# Patient Record
Sex: FEMALE | Race: OTHER | NOT HISPANIC OR LATINO | URBAN - METROPOLITAN AREA
[De-identification: names, ages, dates, MRNs, and addresses within clinical notes are randomized per-mention and may not be internally consistent; named-entity substitution may affect disease eponyms.]

---

## 2018-02-05 ENCOUNTER — EMERGENCY (EMERGENCY)
Facility: HOSPITAL | Age: 68
LOS: 1 days | End: 2018-02-05
Attending: EMERGENCY MEDICINE
Payer: MEDICAID

## 2018-02-05 ENCOUNTER — INPATIENT (INPATIENT)
Facility: HOSPITAL | Age: 68
LOS: 2 days | Discharge: HOME | DRG: 690 | End: 2018-02-08
Attending: FAMILY MEDICINE | Admitting: HOSPITALIST
Payer: MEDICAID

## 2018-02-05 VITALS
WEIGHT: 171.96 LBS | DIASTOLIC BLOOD PRESSURE: 55 MMHG | OXYGEN SATURATION: 94 % | HEIGHT: 65 IN | SYSTOLIC BLOOD PRESSURE: 90 MMHG | TEMPERATURE: 102 F | HEART RATE: 126 BPM | RESPIRATION RATE: 16 BRPM

## 2018-02-05 VITALS
HEART RATE: 75 BPM | DIASTOLIC BLOOD PRESSURE: 57 MMHG | TEMPERATURE: 99 F | OXYGEN SATURATION: 100 % | SYSTOLIC BLOOD PRESSURE: 108 MMHG | RESPIRATION RATE: 15 BRPM

## 2018-02-05 VITALS
SYSTOLIC BLOOD PRESSURE: 130 MMHG | OXYGEN SATURATION: 99 % | WEIGHT: 171.96 LBS | TEMPERATURE: 99 F | HEIGHT: 65 IN | HEART RATE: 84 BPM | RESPIRATION RATE: 16 BRPM | DIASTOLIC BLOOD PRESSURE: 66 MMHG

## 2018-02-05 DIAGNOSIS — Z98.890 OTHER SPECIFIED POSTPROCEDURAL STATES: Chronic | ICD-10-CM

## 2018-02-05 LAB
ALBUMIN SERPL ELPH-MCNC: 2.7 G/DL — LOW (ref 3.3–5)
ALBUMIN SERPL ELPH-MCNC: 2.9 G/DL — LOW (ref 3.3–5)
ALP SERPL-CCNC: 102 U/L — SIGNIFICANT CHANGE UP (ref 40–120)
ALP SERPL-CCNC: 92 U/L — SIGNIFICANT CHANGE UP (ref 40–120)
ALT FLD-CCNC: 19 U/L — SIGNIFICANT CHANGE UP (ref 12–78)
ALT FLD-CCNC: 27 U/L — SIGNIFICANT CHANGE UP (ref 12–78)
ANION GAP SERPL CALC-SCNC: 10 MMOL/L — SIGNIFICANT CHANGE UP (ref 5–17)
ANION GAP SERPL CALC-SCNC: 9 MMOL/L — SIGNIFICANT CHANGE UP (ref 5–17)
APPEARANCE UR: ABNORMAL
APPEARANCE UR: ABNORMAL
APTT BLD: 24.3 SEC — LOW (ref 27.5–37.4)
APTT BLD: 29.2 SEC — SIGNIFICANT CHANGE UP (ref 27.5–37.4)
AST SERPL-CCNC: 28 U/L — SIGNIFICANT CHANGE UP (ref 15–37)
AST SERPL-CCNC: 33 U/L — SIGNIFICANT CHANGE UP (ref 15–37)
BACTERIA # UR AUTO: ABNORMAL
BACTERIA # UR AUTO: ABNORMAL
BASOPHILS # BLD AUTO: 0.1 K/UL — SIGNIFICANT CHANGE UP (ref 0–0.2)
BASOPHILS # BLD AUTO: 0.1 K/UL — SIGNIFICANT CHANGE UP (ref 0–0.2)
BASOPHILS NFR BLD AUTO: 0.5 % — SIGNIFICANT CHANGE UP (ref 0–2)
BASOPHILS NFR BLD AUTO: 0.8 % — SIGNIFICANT CHANGE UP (ref 0–2)
BILIRUB SERPL-MCNC: 0.3 MG/DL — SIGNIFICANT CHANGE UP (ref 0.2–1.2)
BILIRUB SERPL-MCNC: 0.6 MG/DL — SIGNIFICANT CHANGE UP (ref 0.2–1.2)
BILIRUB UR-MCNC: NEGATIVE — SIGNIFICANT CHANGE UP
BILIRUB UR-MCNC: NEGATIVE — SIGNIFICANT CHANGE UP
BUN SERPL-MCNC: 13 MG/DL — SIGNIFICANT CHANGE UP (ref 7–23)
BUN SERPL-MCNC: 16 MG/DL — SIGNIFICANT CHANGE UP (ref 7–23)
CALCIUM SERPL-MCNC: 8 MG/DL — LOW (ref 8.5–10.1)
CALCIUM SERPL-MCNC: 9.3 MG/DL — SIGNIFICANT CHANGE UP (ref 8.5–10.1)
CHLORIDE SERPL-SCNC: 103 MMOL/L — SIGNIFICANT CHANGE UP (ref 96–108)
CHLORIDE SERPL-SCNC: 113 MMOL/L — HIGH (ref 96–108)
CO2 SERPL-SCNC: 20 MMOL/L — LOW (ref 22–31)
CO2 SERPL-SCNC: 23 MMOL/L — SIGNIFICANT CHANGE UP (ref 22–31)
COLOR SPEC: YELLOW — SIGNIFICANT CHANGE UP
COLOR SPEC: YELLOW — SIGNIFICANT CHANGE UP
CREAT SERPL-MCNC: 0.83 MG/DL — SIGNIFICANT CHANGE UP (ref 0.5–1.3)
CREAT SERPL-MCNC: 1.1 MG/DL — SIGNIFICANT CHANGE UP (ref 0.5–1.3)
DIFF PNL FLD: ABNORMAL
DIFF PNL FLD: ABNORMAL
E COLI DNA BLD POS QL NAA+NON-PROBE: SIGNIFICANT CHANGE UP
EOSINOPHIL # BLD AUTO: 0 K/UL — SIGNIFICANT CHANGE UP (ref 0–0.5)
EOSINOPHIL # BLD AUTO: 0.2 K/UL — SIGNIFICANT CHANGE UP (ref 0–0.5)
EOSINOPHIL NFR BLD AUTO: 0.2 % — SIGNIFICANT CHANGE UP (ref 0–6)
EOSINOPHIL NFR BLD AUTO: 1.1 % — SIGNIFICANT CHANGE UP (ref 0–6)
EPI CELLS # UR: SIGNIFICANT CHANGE UP
EPI CELLS # UR: SIGNIFICANT CHANGE UP
GLUCOSE SERPL-MCNC: 101 MG/DL — HIGH (ref 70–99)
GLUCOSE SERPL-MCNC: 105 MG/DL — HIGH (ref 70–99)
GLUCOSE UR QL: NEGATIVE — SIGNIFICANT CHANGE UP
GLUCOSE UR QL: NEGATIVE — SIGNIFICANT CHANGE UP
GRAM STN FLD: SIGNIFICANT CHANGE UP
HCT VFR BLD CALC: 26.6 % — LOW (ref 34.5–45)
HCT VFR BLD CALC: 29.9 % — LOW (ref 34.5–45)
HGB BLD-MCNC: 8.2 G/DL — LOW (ref 11.5–15.5)
HGB BLD-MCNC: 9.5 G/DL — LOW (ref 11.5–15.5)
INR BLD: 1.29 RATIO — HIGH (ref 0.88–1.16)
INR BLD: 1.31 RATIO — HIGH (ref 0.88–1.16)
KETONES UR-MCNC: NEGATIVE — SIGNIFICANT CHANGE UP
KETONES UR-MCNC: NEGATIVE — SIGNIFICANT CHANGE UP
LACTATE SERPL-SCNC: 1.1 MMOL/L — SIGNIFICANT CHANGE UP (ref 0.7–2)
LACTATE SERPL-SCNC: 2.1 MMOL/L — HIGH (ref 0.7–2)
LACTATE SERPL-SCNC: 2.9 MMOL/L — HIGH (ref 0.7–2)
LEUKOCYTE ESTERASE UR-ACNC: ABNORMAL
LEUKOCYTE ESTERASE UR-ACNC: ABNORMAL
LIDOCAIN IGE QN: 143 U/L — SIGNIFICANT CHANGE UP (ref 73–393)
LYMPHOCYTES # BLD AUTO: 0.3 K/UL — LOW (ref 1–3.3)
LYMPHOCYTES # BLD AUTO: 1.2 K/UL — SIGNIFICANT CHANGE UP (ref 1–3.3)
LYMPHOCYTES # BLD AUTO: 2.2 % — LOW (ref 13–44)
LYMPHOCYTES # BLD AUTO: 7.7 % — LOW (ref 13–44)
MAGNESIUM SERPL-MCNC: 1.7 MG/DL — SIGNIFICANT CHANGE UP (ref 1.6–2.6)
MCHC RBC-ENTMCNC: 28.8 PG — SIGNIFICANT CHANGE UP (ref 27–34)
MCHC RBC-ENTMCNC: 29.5 PG — SIGNIFICANT CHANGE UP (ref 27–34)
MCHC RBC-ENTMCNC: 30.7 GM/DL — LOW (ref 32–36)
MCHC RBC-ENTMCNC: 31.8 GM/DL — LOW (ref 32–36)
MCV RBC AUTO: 93 FL — SIGNIFICANT CHANGE UP (ref 80–100)
MCV RBC AUTO: 93.7 FL — SIGNIFICANT CHANGE UP (ref 80–100)
METHOD TYPE: SIGNIFICANT CHANGE UP
MONOCYTES # BLD AUTO: 0.2 K/UL — SIGNIFICANT CHANGE UP (ref 0–0.9)
MONOCYTES # BLD AUTO: 1.1 K/UL — HIGH (ref 0–0.9)
MONOCYTES NFR BLD AUTO: 1.6 % — SIGNIFICANT CHANGE UP (ref 1–9)
MONOCYTES NFR BLD AUTO: 7 % — SIGNIFICANT CHANGE UP (ref 1–9)
NEUTROPHILS # BLD AUTO: 12.4 K/UL — HIGH (ref 1.8–7.4)
NEUTROPHILS # BLD AUTO: 13 K/UL — HIGH (ref 1.8–7.4)
NEUTROPHILS NFR BLD AUTO: 83.7 % — HIGH (ref 43–77)
NEUTROPHILS NFR BLD AUTO: 95.1 % — HIGH (ref 43–77)
NITRITE UR-MCNC: NEGATIVE — SIGNIFICANT CHANGE UP
NITRITE UR-MCNC: POSITIVE
PH UR: 5 — SIGNIFICANT CHANGE UP (ref 5–8)
PH UR: 6.5 — SIGNIFICANT CHANGE UP (ref 5–8)
PLAT MORPH BLD: NORMAL — SIGNIFICANT CHANGE UP
PLATELET # BLD AUTO: 281 K/UL — SIGNIFICANT CHANGE UP (ref 150–400)
PLATELET # BLD AUTO: 374 K/UL — SIGNIFICANT CHANGE UP (ref 150–400)
POTASSIUM SERPL-MCNC: 3.9 MMOL/L — SIGNIFICANT CHANGE UP (ref 3.5–5.3)
POTASSIUM SERPL-MCNC: 4.2 MMOL/L — SIGNIFICANT CHANGE UP (ref 3.5–5.3)
POTASSIUM SERPL-SCNC: 3.9 MMOL/L — SIGNIFICANT CHANGE UP (ref 3.5–5.3)
POTASSIUM SERPL-SCNC: 4.2 MMOL/L — SIGNIFICANT CHANGE UP (ref 3.5–5.3)
PROT SERPL-MCNC: 6.2 G/DL — SIGNIFICANT CHANGE UP (ref 6–8.3)
PROT SERPL-MCNC: 6.9 G/DL — SIGNIFICANT CHANGE UP (ref 6–8.3)
PROT UR-MCNC: 75 MG/DL
PROT UR-MCNC: NEGATIVE — SIGNIFICANT CHANGE UP
PROTHROM AB SERPL-ACNC: 14.1 SEC — HIGH (ref 9.8–12.7)
PROTHROM AB SERPL-ACNC: 14.4 SEC — HIGH (ref 9.8–12.7)
RAPID RVP RESULT: SIGNIFICANT CHANGE UP
RBC # BLD: 2.84 M/UL — LOW (ref 3.8–5.2)
RBC # BLD: 3.22 M/UL — LOW (ref 3.8–5.2)
RBC # FLD: 16.5 % — HIGH (ref 10.3–14.5)
RBC # FLD: 17 % — HIGH (ref 10.3–14.5)
RBC BLD AUTO: NORMAL — SIGNIFICANT CHANGE UP
RBC CASTS # UR COMP ASSIST: ABNORMAL /HPF (ref 0–4)
RBC CASTS # UR COMP ASSIST: ABNORMAL /HPF (ref 0–4)
SODIUM SERPL-SCNC: 136 MMOL/L — SIGNIFICANT CHANGE UP (ref 135–145)
SODIUM SERPL-SCNC: 142 MMOL/L — SIGNIFICANT CHANGE UP (ref 135–145)
SP GR SPEC: 1 — LOW (ref 1.01–1.02)
SP GR SPEC: 1.01 — SIGNIFICANT CHANGE UP (ref 1.01–1.02)
SPECIMEN SOURCE: SIGNIFICANT CHANGE UP
UROBILINOGEN FLD QL: NEGATIVE — SIGNIFICANT CHANGE UP
UROBILINOGEN FLD QL: NEGATIVE — SIGNIFICANT CHANGE UP
WBC # BLD: 13 K/UL — HIGH (ref 3.8–10.5)
WBC # BLD: 15.6 K/UL — HIGH (ref 3.8–10.5)
WBC # FLD AUTO: 13 K/UL — HIGH (ref 3.8–10.5)
WBC # FLD AUTO: 15.6 K/UL — HIGH (ref 3.8–10.5)
WBC UR QL: ABNORMAL
WBC UR QL: ABNORMAL

## 2018-02-05 PROCEDURE — 87798 DETECT AGENT NOS DNA AMP: CPT

## 2018-02-05 PROCEDURE — 99285 EMERGENCY DEPT VISIT HI MDM: CPT

## 2018-02-05 PROCEDURE — 85027 COMPLETE CBC AUTOMATED: CPT

## 2018-02-05 PROCEDURE — 71046 X-RAY EXAM CHEST 2 VIEWS: CPT

## 2018-02-05 PROCEDURE — 87581 M.PNEUMON DNA AMP PROBE: CPT

## 2018-02-05 PROCEDURE — 87040 BLOOD CULTURE FOR BACTERIA: CPT

## 2018-02-05 PROCEDURE — 81001 URINALYSIS AUTO W/SCOPE: CPT

## 2018-02-05 PROCEDURE — 83605 ASSAY OF LACTIC ACID: CPT

## 2018-02-05 PROCEDURE — 87633 RESP VIRUS 12-25 TARGETS: CPT

## 2018-02-05 PROCEDURE — 87186 SC STD MICRODIL/AGAR DIL: CPT

## 2018-02-05 PROCEDURE — 74176 CT ABD & PELVIS W/O CONTRAST: CPT | Mod: 26

## 2018-02-05 PROCEDURE — 87086 URINE CULTURE/COLONY COUNT: CPT

## 2018-02-05 PROCEDURE — 96365 THER/PROPH/DIAG IV INF INIT: CPT

## 2018-02-05 PROCEDURE — 83735 ASSAY OF MAGNESIUM: CPT

## 2018-02-05 PROCEDURE — 99284 EMERGENCY DEPT VISIT MOD MDM: CPT | Mod: 25

## 2018-02-05 PROCEDURE — 87150 DNA/RNA AMPLIFIED PROBE: CPT

## 2018-02-05 PROCEDURE — 83690 ASSAY OF LIPASE: CPT

## 2018-02-05 PROCEDURE — 96375 TX/PRO/DX INJ NEW DRUG ADDON: CPT

## 2018-02-05 PROCEDURE — 85730 THROMBOPLASTIN TIME PARTIAL: CPT

## 2018-02-05 PROCEDURE — 80053 COMPREHEN METABOLIC PANEL: CPT

## 2018-02-05 PROCEDURE — 93010 ELECTROCARDIOGRAM REPORT: CPT

## 2018-02-05 PROCEDURE — 85610 PROTHROMBIN TIME: CPT

## 2018-02-05 PROCEDURE — 99285 EMERGENCY DEPT VISIT HI MDM: CPT | Mod: 25

## 2018-02-05 PROCEDURE — 87486 CHLMYD PNEUM DNA AMP PROBE: CPT

## 2018-02-05 PROCEDURE — 71046 X-RAY EXAM CHEST 2 VIEWS: CPT | Mod: 26

## 2018-02-05 RX ORDER — KETOROLAC TROMETHAMINE 30 MG/ML
30 SYRINGE (ML) INJECTION ONCE
Qty: 0 | Refills: 0 | Status: DISCONTINUED | OUTPATIENT
Start: 2018-02-05 | End: 2018-02-05

## 2018-02-05 RX ORDER — CEFTRIAXONE 500 MG/1
1 INJECTION, POWDER, FOR SOLUTION INTRAMUSCULAR; INTRAVENOUS ONCE
Qty: 0 | Refills: 0 | Status: COMPLETED | OUTPATIENT
Start: 2018-02-05 | End: 2018-02-05

## 2018-02-05 RX ORDER — CEFUROXIME AXETIL 250 MG
1 TABLET ORAL
Qty: 20 | Refills: 0 | OUTPATIENT
Start: 2018-02-05 | End: 2018-02-14

## 2018-02-05 RX ORDER — ONDANSETRON 8 MG/1
1 TABLET, FILM COATED ORAL
Qty: 30 | Refills: 0 | OUTPATIENT
Start: 2018-02-05

## 2018-02-05 RX ORDER — ACETAMINOPHEN 500 MG
650 TABLET ORAL ONCE
Qty: 0 | Refills: 0 | Status: COMPLETED | OUTPATIENT
Start: 2018-02-05 | End: 2018-02-05

## 2018-02-05 RX ORDER — SODIUM CHLORIDE 9 MG/ML
1000 INJECTION INTRAMUSCULAR; INTRAVENOUS; SUBCUTANEOUS
Qty: 0 | Refills: 0 | Status: COMPLETED | OUTPATIENT
Start: 2018-02-05 | End: 2018-02-05

## 2018-02-05 RX ORDER — SODIUM CHLORIDE 9 MG/ML
1000 INJECTION INTRAMUSCULAR; INTRAVENOUS; SUBCUTANEOUS ONCE
Qty: 0 | Refills: 0 | Status: COMPLETED | OUTPATIENT
Start: 2018-02-05 | End: 2018-02-05

## 2018-02-05 RX ADMIN — SODIUM CHLORIDE 1000 MILLILITER(S): 9 INJECTION INTRAMUSCULAR; INTRAVENOUS; SUBCUTANEOUS at 03:04

## 2018-02-05 RX ADMIN — SODIUM CHLORIDE 1000 MILLILITER(S): 9 INJECTION INTRAMUSCULAR; INTRAVENOUS; SUBCUTANEOUS at 23:36

## 2018-02-05 RX ADMIN — Medication 650 MILLIGRAM(S): at 03:04

## 2018-02-05 RX ADMIN — SODIUM CHLORIDE 1000 MILLILITER(S): 9 INJECTION INTRAMUSCULAR; INTRAVENOUS; SUBCUTANEOUS at 05:05

## 2018-02-05 RX ADMIN — CEFTRIAXONE 100 GRAM(S): 500 INJECTION, POWDER, FOR SOLUTION INTRAMUSCULAR; INTRAVENOUS at 03:31

## 2018-02-05 RX ADMIN — SODIUM CHLORIDE 1000 MILLILITER(S): 9 INJECTION INTRAMUSCULAR; INTRAVENOUS; SUBCUTANEOUS at 04:05

## 2018-02-05 RX ADMIN — Medication 650 MILLIGRAM(S): at 03:34

## 2018-02-05 RX ADMIN — CEFTRIAXONE 100 GRAM(S): 500 INJECTION, POWDER, FOR SOLUTION INTRAMUSCULAR; INTRAVENOUS at 23:37

## 2018-02-05 RX ADMIN — Medication 30 MILLIGRAM(S): at 03:34

## 2018-02-05 RX ADMIN — Medication 30 MILLIGRAM(S): at 03:04

## 2018-02-05 NOTE — ED ADULT NURSE NOTE - CHPI ED SYMPTOMS NEG
no vomiting/no hematuria/no abdominal distension/no nausea/no blood in stool/no burning urination/no diarrhea

## 2018-02-05 NOTE — PROVIDER CONTACT NOTE (CRITICAL VALUE NOTIFICATION) - ACTION/TREATMENT ORDERED:
Dr. Whitehead made aware and ordered to repeat lactate after 3rd liter of Normal saline. IV antibiotics given.

## 2018-02-05 NOTE — PROVIDER CONTACT NOTE (CRITICAL VALUE NOTIFICATION) - SITUATION
2/5/18 case discussed with Dr. Alcantar, pt needs to return to ED for repeat BC and abx,  pt called and results given, pt informed to return back to ED, pt verbalized and stated will return to ed

## 2018-02-05 NOTE — ED PROVIDER NOTE - CARE PLAN
Principal Discharge DX:	Positive blood cultures Principal Discharge DX:	Positive blood cultures  Secondary Diagnosis:	Pyelonephritis

## 2018-02-05 NOTE — ED PROVIDER NOTE - OBJECTIVE STATEMENT
pt called back to er for positive blood cultures from er visit early this am. pt relates was seen for fevers, chills, abck pain, was dx with pyelo, was d/c home with po abx, but called back to er for positive blood cultures. pt denies ha, cp, sob, cough  pmd - [facility] ([facility])

## 2018-02-05 NOTE — ED PROVIDER NOTE - OBJECTIVE STATEMENT
pt is a 68 yo f who has hx of ra, htn bl knee surg has been sick with fever chils and uri sx 1 week ago so her pmd gave her a z pack (dr soto at [facility]). she now presents with urine frequency, flank pain, fever and nausea. not a smoker is a social drinker allergic to gentamycin  she takes methotrexate and prednisolone for her ra

## 2018-02-05 NOTE — ED PROVIDER NOTE - MEDICAL DECISION MAKING DETAILS
pt called back to er for positive blood cultures when seen early this am for pyelo - ekg/xr/ct/labs/ivf/abx

## 2018-02-05 NOTE — ED ADULT NURSE NOTE - OBJECTIVE STATEMENT
Received pt awake alert and oriented x 3, NICKERSON, skin intact and airway patent. Pt presents to the ER because she was called to come back  of positive lab results, pt states she was here yesterday. Iv placed, lab sent, fluid running as ordered. will continue to monitor

## 2018-02-06 DIAGNOSIS — Z29.9 ENCOUNTER FOR PROPHYLACTIC MEASURES, UNSPECIFIED: ICD-10-CM

## 2018-02-06 DIAGNOSIS — N39.0 URINARY TRACT INFECTION, SITE NOT SPECIFIED: ICD-10-CM

## 2018-02-06 DIAGNOSIS — D64.9 ANEMIA, UNSPECIFIED: ICD-10-CM

## 2018-02-06 DIAGNOSIS — R78.81 BACTEREMIA: ICD-10-CM

## 2018-02-06 DIAGNOSIS — N30.90 CYSTITIS, UNSPECIFIED WITHOUT HEMATURIA: ICD-10-CM

## 2018-02-06 DIAGNOSIS — M06.9 RHEUMATOID ARTHRITIS, UNSPECIFIED: ICD-10-CM

## 2018-02-06 DIAGNOSIS — D72.829 ELEVATED WHITE BLOOD CELL COUNT, UNSPECIFIED: ICD-10-CM

## 2018-02-06 DIAGNOSIS — I10 ESSENTIAL (PRIMARY) HYPERTENSION: ICD-10-CM

## 2018-02-06 LAB
ANION GAP SERPL CALC-SCNC: 7 MMOL/L — SIGNIFICANT CHANGE UP (ref 5–17)
BASOPHILS # BLD AUTO: 0.1 K/UL — SIGNIFICANT CHANGE UP (ref 0–0.2)
BASOPHILS NFR BLD AUTO: 0.5 % — SIGNIFICANT CHANGE UP (ref 0–2)
BUN SERPL-MCNC: 10 MG/DL — SIGNIFICANT CHANGE UP (ref 7–23)
CALCIUM SERPL-MCNC: 7.5 MG/DL — LOW (ref 8.5–10.1)
CHLORIDE SERPL-SCNC: 118 MMOL/L — HIGH (ref 96–108)
CO2 SERPL-SCNC: 22 MMOL/L — SIGNIFICANT CHANGE UP (ref 22–31)
CREAT SERPL-MCNC: 0.8 MG/DL — SIGNIFICANT CHANGE UP (ref 0.5–1.3)
EOSINOPHIL # BLD AUTO: 0.2 K/UL — SIGNIFICANT CHANGE UP (ref 0–0.5)
EOSINOPHIL NFR BLD AUTO: 1.4 % — SIGNIFICANT CHANGE UP (ref 0–6)
GLUCOSE SERPL-MCNC: 87 MG/DL — SIGNIFICANT CHANGE UP (ref 70–99)
HCT VFR BLD CALC: 24.8 % — LOW (ref 34.5–45)
HGB BLD-MCNC: 7.6 G/DL — LOW (ref 11.5–15.5)
LACTATE SERPL-SCNC: 1.4 MMOL/L — SIGNIFICANT CHANGE UP (ref 0.7–2)
LACTATE SERPL-SCNC: 3.5 MMOL/L — HIGH (ref 0.7–2)
LYMPHOCYTES # BLD AUTO: 1 K/UL — SIGNIFICANT CHANGE UP (ref 1–3.3)
LYMPHOCYTES # BLD AUTO: 7.5 % — LOW (ref 13–44)
MCHC RBC-ENTMCNC: 29.8 PG — SIGNIFICANT CHANGE UP (ref 27–34)
MCHC RBC-ENTMCNC: 30.9 GM/DL — LOW (ref 32–36)
MCV RBC AUTO: 96.5 FL — SIGNIFICANT CHANGE UP (ref 80–100)
MONOCYTES # BLD AUTO: 1 K/UL — HIGH (ref 0–0.9)
MONOCYTES NFR BLD AUTO: 7.9 % — SIGNIFICANT CHANGE UP (ref 1–9)
NEUTROPHILS # BLD AUTO: 11 K/UL — HIGH (ref 1.8–7.4)
NEUTROPHILS NFR BLD AUTO: 82.7 % — HIGH (ref 43–77)
PLATELET # BLD AUTO: 261 K/UL — SIGNIFICANT CHANGE UP (ref 150–400)
POTASSIUM SERPL-MCNC: 4.6 MMOL/L — SIGNIFICANT CHANGE UP (ref 3.5–5.3)
POTASSIUM SERPL-SCNC: 4.6 MMOL/L — SIGNIFICANT CHANGE UP (ref 3.5–5.3)
RBC # BLD: 2.57 M/UL — LOW (ref 3.8–5.2)
RBC # FLD: 17.8 % — HIGH (ref 10.3–14.5)
SODIUM SERPL-SCNC: 147 MMOL/L — HIGH (ref 135–145)
WBC # BLD: 13.3 K/UL — HIGH (ref 3.8–10.5)
WBC # FLD AUTO: 13.3 K/UL — HIGH (ref 3.8–10.5)

## 2018-02-06 PROCEDURE — 12345: CPT | Mod: NC

## 2018-02-06 PROCEDURE — 99223 1ST HOSP IP/OBS HIGH 75: CPT | Mod: AI,GC

## 2018-02-06 RX ORDER — LISINOPRIL 2.5 MG/1
10 TABLET ORAL DAILY
Qty: 0 | Refills: 0 | Status: DISCONTINUED | OUTPATIENT
Start: 2018-02-06 | End: 2018-02-08

## 2018-02-06 RX ORDER — ACETAMINOPHEN 500 MG
650 TABLET ORAL EVERY 6 HOURS
Qty: 0 | Refills: 0 | Status: DISCONTINUED | OUTPATIENT
Start: 2018-02-06 | End: 2018-02-06

## 2018-02-06 RX ORDER — IBUPROFEN 200 MG
600 TABLET ORAL EVERY 6 HOURS
Qty: 0 | Refills: 0 | Status: DISCONTINUED | OUTPATIENT
Start: 2018-02-06 | End: 2018-02-08

## 2018-02-06 RX ORDER — ACETAMINOPHEN 500 MG
650 TABLET ORAL EVERY 6 HOURS
Qty: 0 | Refills: 0 | Status: DISCONTINUED | OUTPATIENT
Start: 2018-02-06 | End: 2018-02-08

## 2018-02-06 RX ORDER — LEFLUNOMIDE 10 MG/1
10 TABLET ORAL DAILY
Qty: 0 | Refills: 0 | Status: DISCONTINUED | OUTPATIENT
Start: 2018-02-06 | End: 2018-02-08

## 2018-02-06 RX ORDER — PREDNISOLONE 5 MG
5 TABLET ORAL DAILY
Qty: 0 | Refills: 0 | Status: DISCONTINUED | OUTPATIENT
Start: 2018-02-06 | End: 2018-02-08

## 2018-02-06 RX ORDER — LANOLIN ALCOHOL/MO/W.PET/CERES
5 CREAM (GRAM) TOPICAL ONCE
Qty: 0 | Refills: 0 | Status: COMPLETED | OUTPATIENT
Start: 2018-02-06 | End: 2018-02-06

## 2018-02-06 RX ORDER — CEFTRIAXONE 500 MG/1
1 INJECTION, POWDER, FOR SOLUTION INTRAMUSCULAR; INTRAVENOUS EVERY 24 HOURS
Qty: 0 | Refills: 0 | Status: DISCONTINUED | OUTPATIENT
Start: 2018-02-06 | End: 2018-02-08

## 2018-02-06 RX ORDER — METHOTREXATE 2.5 MG/1
12.5 TABLET ORAL
Qty: 0 | Refills: 0 | Status: DISCONTINUED | OUTPATIENT
Start: 2018-02-06 | End: 2018-02-08

## 2018-02-06 RX ORDER — SODIUM CHLORIDE 9 MG/ML
1000 INJECTION INTRAMUSCULAR; INTRAVENOUS; SUBCUTANEOUS ONCE
Qty: 0 | Refills: 0 | Status: COMPLETED | OUTPATIENT
Start: 2018-02-06 | End: 2018-02-06

## 2018-02-06 RX ORDER — METOPROLOL TARTRATE 50 MG
50 TABLET ORAL DAILY
Qty: 0 | Refills: 0 | Status: DISCONTINUED | OUTPATIENT
Start: 2018-02-06 | End: 2018-02-08

## 2018-02-06 RX ORDER — SODIUM CHLORIDE 9 MG/ML
1000 INJECTION INTRAMUSCULAR; INTRAVENOUS; SUBCUTANEOUS
Qty: 0 | Refills: 0 | Status: DISCONTINUED | OUTPATIENT
Start: 2018-02-05 | End: 2018-02-07

## 2018-02-06 RX ADMIN — SODIUM CHLORIDE 75 MILLILITER(S): 9 INJECTION INTRAMUSCULAR; INTRAVENOUS; SUBCUTANEOUS at 04:27

## 2018-02-06 RX ADMIN — Medication 50 MILLIGRAM(S): at 06:03

## 2018-02-06 RX ADMIN — Medication 5 MILLIGRAM(S): at 23:49

## 2018-02-06 RX ADMIN — SODIUM CHLORIDE 1000 MILLILITER(S): 9 INJECTION INTRAMUSCULAR; INTRAVENOUS; SUBCUTANEOUS at 00:39

## 2018-02-06 RX ADMIN — SODIUM CHLORIDE 1000 MILLILITER(S): 9 INJECTION INTRAMUSCULAR; INTRAVENOUS; SUBCUTANEOUS at 01:45

## 2018-02-06 RX ADMIN — CEFTRIAXONE 100 GRAM(S): 500 INJECTION, POWDER, FOR SOLUTION INTRAMUSCULAR; INTRAVENOUS at 21:53

## 2018-02-06 RX ADMIN — Medication 5 MILLIGRAM(S): at 06:03

## 2018-02-06 RX ADMIN — LEFLUNOMIDE 10 MILLIGRAM(S): 10 TABLET ORAL at 18:24

## 2018-02-06 RX ADMIN — SODIUM CHLORIDE 2000 MILLILITER(S): 9 INJECTION INTRAMUSCULAR; INTRAVENOUS; SUBCUTANEOUS at 06:19

## 2018-02-06 NOTE — H&P ADULT - PROBLEM SELECTOR PLAN 4
1. Continue Prednisolone 5mg qd, Methrotrexate 12.5 weekly (every Wednesday only), Leflunomide 10mg qd Controlled  1. Continue home meds: Metroprolol 50mg qd, Lisinopril 10mg qd with hold parameters  2. vitals per routine

## 2018-02-06 NOTE — H&P ADULT - PROBLEM SELECTOR PLAN 3
Controlled  1. Continue home meds: Metroprolol 50mg qd, Lisinopril 10mg qd with hold parameters  2. vitals per routine likely elevated at baseline considering chronic steroid for RA plus may have compounding leukocytosis in setting of bacteremia   1. Continue to trend CBC  2. F/u blood and urine cultures  3. Continue Rocephin IV

## 2018-02-06 NOTE — H&P ADULT - ATTENDING COMMENTS
Pt with gram negative rods in blood cx.  Will be admitted for cystitis and bactermia.  Continue ceftriaxone.  Pt has RA.  On chronic steroids, likely also causing elevated WBCs.  Repeat labs AM.

## 2018-02-06 NOTE — H&P ADULT - RS GEN PE MLT RESP DETAILS PC
no chest wall tenderness/respirations non-labored/clear to auscultation bilaterally/normal/airway patent/no wheezes/no rales/no rhonchi/good air movement/breath sounds equal

## 2018-02-06 NOTE — H&P ADULT - HISTORY OF PRESENT ILLNESS
This is a 68 y/o F with pmhx of HTN and RA who presented to ED yesterday with complaint of increased urinary frequency, chills and mild lower back pain for 3-4 days, was sent home with PO abx for pyelo, now found to have GNR in blood cultures. Patient denies dysuria or hematuria. Denies any body aches, cough, fever, diarrhea or abdominal upset. Denies any sick contacts. Other than increased urinary frequency, does not endorse any acute complaints.     In the ED, vitals were stable. Labs significant for WBC 15.6 H/h 8.2/26.6 Lactate 2.1 Blood cultures positive for GNR. Patient received 3L NS boluses, Rocephin x1. CT showed mild thickening of urinary bladder, negative for calculus or hydro. EKG was NSR 89bpm.

## 2018-02-06 NOTE — PROGRESS NOTE ADULT - PROBLEM SELECTOR PLAN 3
likely elevated at baseline considering chronic steroid for RA plus may have compounding leukocytosis in setting of bacteremia   Continue to trend CBC  F/u blood and urine cultures  Continue Rocephin IV

## 2018-02-06 NOTE — H&P ADULT - PROBLEM SELECTOR PLAN 5
IMPROVE VTE Individual Risk Assessment          RISK                                                          Points  [  ] Previous VTE                                                3  [  ] Thrombophilia                                             2  [  ] Lower limb paralysis                                   2        (unable to hold up >15 seconds)    [  ] Current Cancer                                             2         (within 6 months)  [  ] Immobilization > 24 hrs                              1  [  ] ICU/CCU stay > 24 hours                             1  [x  ] Age > 60                                                         1    IMPROVE VTE Score: 1    SCD for dvt ppx  DASH diet, vegetarian   Ambulate as tolerated 1. Continue Prednisolone 5mg qd, Methrotrexate 12.5 weekly (every Wednesday only), Leflunomide 10mg qd

## 2018-02-06 NOTE — PROGRESS NOTE ADULT - PROBLEM SELECTOR PLAN 6
1. Continue Prednisolone 5mg qd, Methrotrexate 12.5 weekly (every Wednesday only), Leflunomide 10mg qd

## 2018-02-06 NOTE — PROGRESS NOTE ADULT - SUBJECTIVE AND OBJECTIVE BOX
Patient is a 67y old  Female who presents with a chief complaint of chills fever and frequent urination (2018 09:45)      INTERVAL HPI: Pt seen and examined bedside, has no urinary symptoms. c/o mild low back pain. No n/V  OVERNIGHT EVENTS:  T(F): 98.1 (18 @ 13:00), Max: 99.4 (18 @ 05:39)  HR: 94 (18 @ 13:00) (84 - 100)  BP: 133/69 (18 @ 13:00) (129/69 - 142/77)  RR: 18 (18 @ 13:00) (16 - 18)  SpO2: 98% (18 @ 13:00) (98% - 100%)  Wt(kg): --  I&O's Summary    2018 07:01  -  2018 17:01  --------------------------------------------------------  IN: 240 mL / OUT: 0 mL / NET: 240 mL        REVIEW OF SYSTEM:    Constitutional: No fever, chills, fatigue  Neuro: No headache, numbness, weakness  Resp: No cough, wheezing, shortness of breath  CVS: No chest pain, palpitations, leg swelling  GI: No abdominal pain, nausea, vomiting, diarrhea   : No dysuria, frequency, incontinence  Skin: No itching, burning, rashes, or lesions   Msk: No joint pain or swelling  Psych: No depression, anxiety, mood swings          PHYSICAL EXAM:  GENERAL: NAD, well-developed  HEAD:  Atraumatic, Normocephalic  EYES: EOMI, PERRLA, conjunctiva and sclera clear  ENMT: No tonsillar erythema, exudates, or enlargement; Moist mucous membranes,   NECK: Supple, No JVD, Normal thyroid  HEART: Regular rate and rhythm; No murmurs, rubs, or gallops  RESPIRATORY: CTA B/L, No wheezing / rhonchi  ABDOMEN: Soft, Nontender, Nondistended; Bowel sounds present  NEUROLOGY: A&Ox3, nonfocal, moving all extremities.  EXTREMITIES:  2+ Peripheral Pulses, No clubbing, cyanosis, or edema  SKIN: warm, dry, normal color, no rash or abnormal lesions        LABS:                        7.6    13.3  )-----------( 261      ( 2018 04:04 )             24.8     02-06    147<H>  |  118<H>  |  10  ----------------------------<  87  4.6   |  22  |  0.80    Ca    7.5<L>      2018 04:04  Mg     1.7         TPro  6.2  /  Alb  2.7<L>  /  TBili  0.3  /  DBili  x   /  AST  33  /  ALT  27  /  AlkPhos  92      PT/INR - ( 2018 22:51 )   PT: 14.4 sec;   INR: 1.31 ratio         PTT - ( 2018 22:51 )  PTT:29.2 sec  Urinalysis Basic - ( 2018 22:58 )    Color: Yellow / Appearance: Slightly Turbid / S.005 / pH: x  Gluc: x / Ketone: Negative  / Bili: Negative / Urobili: Negative   Blood: x / Protein: Negative / Nitrite: Negative   Leuk Esterase: Small / RBC: 3-5 /HPF / WBC 11-25   Sq Epi: x / Non Sq Epi: Few / Bacteria: Occasional      CAPILLARY BLOOD GLUCOSE           @ 08:27   Growth in aerobic and anaerobic bottles: Gram Negative Rods  --  Blood Culture PCR   @ 08:17   >100,000 CFU/ml Escherichia coli  --  --          MEDICATIONS  (STANDING):  cefTRIAXone   IVPB 1 Gram(s) IV Intermittent every 24 hours  leflunomide 10 milliGRAM(s) Oral daily  lisinopril 10 milliGRAM(s) Oral daily  methotrexate (Non - oncologic) 12.5 milliGRAM(s) Oral <User Schedule>  metoprolol succinate ER 50 milliGRAM(s) Oral daily  prednisoLONE    Syrup 3 mG/mL (PRELONE) 5 milliGRAM(s) Oral daily  sodium chloride 0.9%. 1000 milliLiter(s) (75 mL/Hr) IV Continuous <Continuous>    MEDICATIONS  (PRN):  acetaminophen   Tablet 650 milliGRAM(s) Oral every 6 hours PRN For Temp greater than 38 C (100.4 F)  ibuprofen  Tablet 600 milliGRAM(s) Oral every 6 hours PRN mild pain

## 2018-02-06 NOTE — H&P ADULT - ASSESSMENT
66 y/o F with pmhx of HTN and RA who presented to ED yesterday with complaint of increased urinary frequency, chills and mild lower back pain for 3-4 days, was sent home with PO abx for pyelo, now found to have GNR in blood cultures; admitted with cystitis.

## 2018-02-06 NOTE — H&P ADULT - PROBLEM SELECTOR PLAN 6
IMPROVE VTE Individual Risk Assessment          RISK                                                          Points  [  ] Previous VTE                                                3  [  ] Thrombophilia                                             2  [  ] Lower limb paralysis                                   2        (unable to hold up >15 seconds)    [  ] Current Cancer                                             2         (within 6 months)  [  ] Immobilization > 24 hrs                              1  [  ] ICU/CCU stay > 24 hours                             1  [x  ] Age > 60                                                         1    IMPROVE VTE Score: 1    SCD for dvt ppx  DASH diet, vegetarian   Ambulate as tolerated

## 2018-02-06 NOTE — H&P ADULT - NSHPREVIEWOFSYSTEMS_GEN_ALL_CORE
Constitutional: denies fever, +chills, diaphoresis   HEENT: denies blurry vision, difficulty hearing  Respiratory: denies SOB, PETERSON, cough, sputum production, wheezing, hemoptysis  Cardiovascular: denies CP, palpitations, edema  Gastrointestinal: denies nausea, vomiting, diarrhea, constipation, abdominal pain, melena, hematochezia   Genitourinary: denies dysuria, + frequency, denies urgency, hematuria   Skin/Breast: denies rash, itching  Musculoskeletal: denies myalgias, joint swelling, muscle weakness  Neurologic: denies headache, weakness, dizziness, paresthesias, numbness/tingling  Psychiatric: denies feeling anxious, depressed, suicidal, homicidal thoughts  Hematology/Oncology: denies bruising, tender or enlarged lymph nodes   ROS negative except as noted above

## 2018-02-06 NOTE — H&P ADULT - NSHPSOCIALHISTORY_GEN_ALL_CORE
, lives with   Retire   Denies any smoking history  Social ETOH  Denies any drug use  +Flu vaccine; +PNA vaccine

## 2018-02-06 NOTE — H&P ADULT - PROBLEM SELECTOR PLAN 1
likely secondary to cystitis; GNR in blood cultures obtained from ED on 2/5   1. Admit to [facility]  2. s/p Rocephin x1 dose, continue Rocephin  3. F/u Repeat blood and urine cultures  4. Trend CBC  5. Tylenol prn fever and mild pain  6. s/p 3L NS in ED, will continue IVF @ 75cc/hr   7. Initial lactate 2.1, continue trend until wnl

## 2018-02-07 ENCOUNTER — TRANSCRIPTION ENCOUNTER (OUTPATIENT)
Age: 68
End: 2018-02-07

## 2018-02-07 DIAGNOSIS — D50.8 OTHER IRON DEFICIENCY ANEMIAS: ICD-10-CM

## 2018-02-07 LAB
-  AMIKACIN: SIGNIFICANT CHANGE UP
-  AMIKACIN: SIGNIFICANT CHANGE UP
-  AMPICILLIN/SULBACTAM: SIGNIFICANT CHANGE UP
-  AMPICILLIN/SULBACTAM: SIGNIFICANT CHANGE UP
-  AMPICILLIN: SIGNIFICANT CHANGE UP
-  AMPICILLIN: SIGNIFICANT CHANGE UP
-  AZTREONAM: SIGNIFICANT CHANGE UP
-  AZTREONAM: SIGNIFICANT CHANGE UP
-  CEFAZOLIN: SIGNIFICANT CHANGE UP
-  CEFAZOLIN: SIGNIFICANT CHANGE UP
-  CEFEPIME: SIGNIFICANT CHANGE UP
-  CEFEPIME: SIGNIFICANT CHANGE UP
-  CEFOXITIN: SIGNIFICANT CHANGE UP
-  CEFOXITIN: SIGNIFICANT CHANGE UP
-  CEFTAZIDIME: SIGNIFICANT CHANGE UP
-  CEFTAZIDIME: SIGNIFICANT CHANGE UP
-  CEFTRIAXONE: SIGNIFICANT CHANGE UP
-  CEFTRIAXONE: SIGNIFICANT CHANGE UP
-  CIPROFLOXACIN: SIGNIFICANT CHANGE UP
-  CIPROFLOXACIN: SIGNIFICANT CHANGE UP
-  ERTAPENEM: SIGNIFICANT CHANGE UP
-  ERTAPENEM: SIGNIFICANT CHANGE UP
-  GENTAMICIN: SIGNIFICANT CHANGE UP
-  GENTAMICIN: SIGNIFICANT CHANGE UP
-  IMIPENEM: SIGNIFICANT CHANGE UP
-  IMIPENEM: SIGNIFICANT CHANGE UP
-  LEVOFLOXACIN: SIGNIFICANT CHANGE UP
-  LEVOFLOXACIN: SIGNIFICANT CHANGE UP
-  MEROPENEM: SIGNIFICANT CHANGE UP
-  MEROPENEM: SIGNIFICANT CHANGE UP
-  NITROFURANTOIN: SIGNIFICANT CHANGE UP
-  PIPERACILLIN/TAZOBACTAM: SIGNIFICANT CHANGE UP
-  PIPERACILLIN/TAZOBACTAM: SIGNIFICANT CHANGE UP
-  TOBRAMYCIN: SIGNIFICANT CHANGE UP
-  TOBRAMYCIN: SIGNIFICANT CHANGE UP
-  TRIMETHOPRIM/SULFAMETHOXAZOLE: SIGNIFICANT CHANGE UP
-  TRIMETHOPRIM/SULFAMETHOXAZOLE: SIGNIFICANT CHANGE UP
ANION GAP SERPL CALC-SCNC: 8 MMOL/L — SIGNIFICANT CHANGE UP (ref 5–17)
BUN SERPL-MCNC: 8 MG/DL — SIGNIFICANT CHANGE UP (ref 7–23)
CALCIUM SERPL-MCNC: 7.6 MG/DL — LOW (ref 8.5–10.1)
CHLORIDE SERPL-SCNC: 116 MMOL/L — HIGH (ref 96–108)
CO2 SERPL-SCNC: 21 MMOL/L — LOW (ref 22–31)
CREAT SERPL-MCNC: 0.68 MG/DL — SIGNIFICANT CHANGE UP (ref 0.5–1.3)
CULTURE RESULTS: NO GROWTH — SIGNIFICANT CHANGE UP
CULTURE RESULTS: SIGNIFICANT CHANGE UP
FERRITIN SERPL-MCNC: 47 NG/ML — SIGNIFICANT CHANGE UP (ref 15–150)
FOLATE SERPL-MCNC: 12 NG/ML — SIGNIFICANT CHANGE UP (ref 4.8–24.2)
GLUCOSE SERPL-MCNC: 85 MG/DL — SIGNIFICANT CHANGE UP (ref 70–99)
HCT VFR BLD CALC: 23.8 % — LOW (ref 34.5–45)
HCT VFR BLD CALC: 25.7 % — LOW (ref 34.5–45)
HGB BLD-MCNC: 7.3 G/DL — LOW (ref 11.5–15.5)
HGB BLD-MCNC: 8.1 G/DL — LOW (ref 11.5–15.5)
IRON SATN MFR SERPL: 24 UG/DL — LOW (ref 30–160)
IRON SATN MFR SERPL: 8 % — LOW (ref 14–50)
MCHC RBC-ENTMCNC: 29 PG — SIGNIFICANT CHANGE UP (ref 27–34)
MCHC RBC-ENTMCNC: 30.8 GM/DL — LOW (ref 32–36)
MCV RBC AUTO: 94.2 FL — SIGNIFICANT CHANGE UP (ref 80–100)
METHOD TYPE: SIGNIFICANT CHANGE UP
METHOD TYPE: SIGNIFICANT CHANGE UP
ORGANISM # SPEC MICROSCOPIC CNT: SIGNIFICANT CHANGE UP
PLATELET # BLD AUTO: 260 K/UL — SIGNIFICANT CHANGE UP (ref 150–400)
POTASSIUM SERPL-MCNC: 3.7 MMOL/L — SIGNIFICANT CHANGE UP (ref 3.5–5.3)
POTASSIUM SERPL-SCNC: 3.7 MMOL/L — SIGNIFICANT CHANGE UP (ref 3.5–5.3)
RBC # BLD: 2.52 M/UL — LOW (ref 3.8–5.2)
RBC # FLD: 17.2 % — HIGH (ref 10.3–14.5)
SODIUM SERPL-SCNC: 145 MMOL/L — SIGNIFICANT CHANGE UP (ref 135–145)
SPECIMEN SOURCE: SIGNIFICANT CHANGE UP
TIBC SERPL-MCNC: 291 UG/DL — SIGNIFICANT CHANGE UP (ref 220–430)
UIBC SERPL-MCNC: 267 UG/DL — SIGNIFICANT CHANGE UP (ref 110–370)
VIT B12 SERPL-MCNC: 1856 PG/ML — HIGH (ref 232–1245)
WBC # BLD: 7.6 K/UL — SIGNIFICANT CHANGE UP (ref 3.8–10.5)
WBC # FLD AUTO: 7.6 K/UL — SIGNIFICANT CHANGE UP (ref 3.8–10.5)

## 2018-02-07 PROCEDURE — 99233 SBSQ HOSP IP/OBS HIGH 50: CPT | Mod: GC

## 2018-02-07 RX ORDER — FERROUS SULFATE 325(65) MG
1 TABLET ORAL
Qty: 90 | Refills: 0
Start: 2018-02-07 | End: 2018-03-08

## 2018-02-07 RX ORDER — FERROUS SULFATE 325(65) MG
1 TABLET ORAL
Qty: 0 | Refills: 0 | DISCHARGE
Start: 2018-02-07 | End: 2018-03-08

## 2018-02-07 RX ORDER — PANTOPRAZOLE SODIUM 20 MG/1
40 TABLET, DELAYED RELEASE ORAL DAILY
Qty: 0 | Refills: 0 | Status: DISCONTINUED | OUTPATIENT
Start: 2018-02-07 | End: 2018-02-08

## 2018-02-07 RX ORDER — FOLIC ACID 0.8 MG
1 TABLET ORAL DAILY
Qty: 0 | Refills: 0 | Status: DISCONTINUED | OUTPATIENT
Start: 2018-02-07 | End: 2018-02-08

## 2018-02-07 RX ORDER — CIPROFLOXACIN LACTATE 400MG/40ML
1 VIAL (ML) INTRAVENOUS
Qty: 14 | Refills: 0
Start: 2018-02-07 | End: 2018-02-13

## 2018-02-07 RX ADMIN — Medication 1 MILLIGRAM(S): at 10:13

## 2018-02-07 RX ADMIN — Medication 50 MILLIGRAM(S): at 06:41

## 2018-02-07 RX ADMIN — METHOTREXATE 12.5 MILLIGRAM(S): 2.5 TABLET ORAL at 10:11

## 2018-02-07 RX ADMIN — CEFTRIAXONE 100 GRAM(S): 500 INJECTION, POWDER, FOR SOLUTION INTRAMUSCULAR; INTRAVENOUS at 21:21

## 2018-02-07 RX ADMIN — LISINOPRIL 10 MILLIGRAM(S): 2.5 TABLET ORAL at 06:41

## 2018-02-07 RX ADMIN — Medication 5 MILLIGRAM(S): at 06:41

## 2018-02-07 RX ADMIN — LEFLUNOMIDE 10 MILLIGRAM(S): 10 TABLET ORAL at 12:52

## 2018-02-07 RX ADMIN — Medication 1 TABLET(S): at 10:31

## 2018-02-07 RX ADMIN — Medication 1 TABLET(S): at 18:11

## 2018-02-07 RX ADMIN — PANTOPRAZOLE SODIUM 40 MILLIGRAM(S): 20 TABLET, DELAYED RELEASE ORAL at 18:11

## 2018-02-07 NOTE — PROGRESS NOTE ADULT - ATTENDING COMMENTS
68 y/o F with pmhx of HTN and RA who presented to ED yesterday with complaint of increased urinary frequency, chills and mild lower back pain for 3-4 days, was sent home with PO abx for pyelo, now found to have GNR in blood cultures; admitted with cystitis.  Plan: cont antibx as per ID, apprec GI recs, trend h/h transfuse for less than 7, iron supplementation as well as folic acid, npo mno for egd tomorrow

## 2018-02-07 NOTE — CONSULT NOTE ADULT - ASSESSMENT
E. coli septicemia  Secondary to pyelonephritis  Doing well  Back to baseline  WBC normal  F/U Cx NTD  Blood isolate likely same as urine.  No objection to DC home on Ciprofloxacin 500mg PO Q12H x7days  Discussed with patient, aware that if result of blood cx are unexpected (e.g. persistently positive, resistant sravanthi) that it could resuscitate return to the hospital, change in antibiotic, etc.  Patient/spouse voiced understanding. All questions answered  Left message for Dr. Beckford.    Thank you for the courtesy of this referral.    Bahman Rosas MD  [phone]

## 2018-02-07 NOTE — DISCHARGE NOTE ADULT - CARE PROVIDER_API CALL
Magi Villanueva), Internal Medicine  [address] [place] [address]  Phone: [phone]  Fax: [phone] Magi Villanueva), Internal Medicine  [address] [place] [address]  Phone: [phone]  Fax: [phone]    Bahman Rosas), Infectious Disease; Internal Medicine  [address] [place] [address]  Phone: [phone]  Fax: [phone] Magi Villanueva), Internal Medicine  [address] [place] [address]  Phone: [phone]  Fax: [phone]    Bahman Rosas), Infectious Disease; Internal Medicine  [address] [address]  Phone: [phone]  Fax: [phone]    Nash Salvador (), Gastroenterology; Internal Medicine  [address] [place] [address]  Phone: [phone]  [phone]: [phone]

## 2018-02-07 NOTE — CONSULT NOTE ADULT - SUBJECTIVE AND OBJECTIVE BOX
Full note to follow.  Pyelonephritis with E. coli septicemia  Urine isolate FQ sensitive, expect the blood isolate will be too.  Urine cx sensi will not be back until this evening  No ID objection to discharge on Ciprofloxacin 500mg PO Q12H  Left message for Dr. Beckford  Thank you for the courtesy of this referral.  Bahman Rosas MD  [phone]  ---------------------------  [facility], Division of Infectious Diseases  Elmer Rosas, JANIS Gonzalez, LUCILA TRUONG, NITHIN  67y, Female  [id number]    HPI--  *** insert HPI ***    PMH/PSH--  Hypertension  Rheumatoid arthritis  H/O knee surgery      Allergies--      Medications--  Antibiotics: cefTRIAXone   IVPB 1 Gram(s) IV Intermittent every 24 hours    Immunologic: methotrexate (Non - oncologic) 12.5 milliGRAM(s) Oral <User Schedule>    Other: acetaminophen   Tablet PRN  calcium carbonate 1250 mG + Vitamin D (OsCal 500 + D)  folic acid  ibuprofen  Tablet PRN  leflunomide  lisinopril  metoprolol succinate ER  prednisoLONE    Syrup 3 mG/mL (PRELONE)      Social History--  EtOH: denies ***  Tobacco: denies ***  Drug Use: denies ***    Family/Marital History--  No pertinent family history in first degree relatives    Remainder not relevant to clinical concern.    Travel/Environmental/Occupational History:  *** insert T/E/O Hx ***    Review of Systems:  A >=10-point review of systems was obtained.     Pertinent positives and negatives--  Constitutional: No fevers. No Chills. No Rigors.   Eyes:  ENMT:  Cardiovascular: No chest pain. No palpitations.  Respiratory: No shortness of breath. No cough.  Gastrointestinal: No nausea or vomiting. No diarrhea or constipation.   Genitourinary:  Musculoskeletal:  Skin:  Neurologic:  Psychiatric: Pleasant. Appropriate affect.  Endocrine:  Heme/Lymphatic:  Allergy/Immunologic:    Review of systems otherwise negative except as previously noted.    Physical Exam--  Vital Signs: T(F): 97.9 (02-07-18 @ 05:00), Max: 98.2 (02-06-18 @ 20:57)  HR: 80 (02-07-18 @ 05:00)  BP: 140/79 (02-07-18 @ 05:00)  RR: 17 (02-07-18 @ 05:00)  SpO2: 99% (02-07-18 @ 05:00)  Wt(kg): --  General: Nontoxic-appearing Female in no acute distress.  HEENT: AT/NC. PERRL. EOMI. Anicteric. Conjunctiva pink and moist. Oropharynx clear. Dentition fair.  Neck: Not rigid. No sense of mass.  Nodes: None palpable.  Lungs: Clear bilaterally without rales, wheezing or rhonchi  Heart: Regular rate and rhythm. No Murmur. No rub. No gallop. No palpable thrill.  Abdomen: Bowel sounds present and normoactive. Soft. Nondistended. Nontender. No sense of mass. No organomegaly.  Back: No spinal tenderness. No costovertebral angle tenderness.   Extremities: No cyanosis or clubbing. No edema.   Skin: Warm. Dry. Good turgor. No rash. No vasculitic stigmata.  Psychiatric: Appropriate affect and mood for situation.         Laboratory & Imaging Data--  CBC                        7.3    7.6   )-----------( 260      ( 07 Feb 2018 06:58 )             23.8       Chemistries  02-07    145  |  116<H>  |  8   ----------------------------<  85  3.7   |  21<L>  |  0.68    Ca    7.6<L>      07 Feb 2018 06:58    TPro  6.2  /  Alb  2.7<L>  /  TBili  0.3  /  DBili  x   /  AST  33  /  ALT  27  /  AlkPhos  92  02-05      Culture Data    Culture - Urine (collected 06 Feb 2018 09:16)  Source: .Urine Clean Catch (Midstream)  Final Report (07 Feb 2018 07:24):    No growth    Culture - Blood (collected 06 Feb 2018 08:56)  Source: .Blood Blood-Peripheral  Preliminary Report (07 Feb 2018 09:01):    No growth to date.    Culture - Blood (collected 06 Feb 2018 08:55)  Source: .Blood Blood-Peripheral  Preliminary Report (07 Feb 2018 09:01):    No growth to date.    Culture - Blood (collected 05 Feb 2018 08:27)  Source: .Blood Blood  Gram Stain (05 Feb 2018 20:13):    Growth in anaerobic bottle: Gram Negative Rods    Growth in aerobic bottle: Gram Negative Rods  Preliminary Report (06 Feb 2018 18:30):    Growth in aerobic and anaerobic bottles: Escherichia coli    ***Blood Panel PCR results on this specimen are available    approximately 3 hours after the Gram stain result.***    Gram stain, PCR, and/or culture results may not always    correspond due to difference in methodologies.    ************************************************************    This PCR assay was performed using [employer].    The following targets are tested for: Enterococcus,    vancomycin resistant enterococci, Listeria monocytogenes,    coagulase negative staphylococci, S. aureus,    methicillin resistant S. aureus, Streptococcus agalactiae    (Group B), S. pneumoniae, S. pyogenes (Group A),    Acinetobacter baumannii, Enterobacter cloacae, E. coli,    Klebsiella oxytoca, K. pneumoniae, Proteus sp.,    Serratia marcescens, Haemophilus influenzae,    Neisseria meningitidis, Pseudomonas aeruginosa, Candida    albicans, C. glabrata, C krusei, C parapsilosis,    C. tropicalis and the KPC resistance gene.  Organism: Blood Culture PCR (05 Feb 2018 19:12)  Organism: Blood Culture PCR (05 Feb 2018 19:12)    Culture - Blood (collected 05 Feb 2018 08:27)  Source: .Blood Blood  Gram Stain (05 Feb 2018 18:23):    Growth in aerobic and anaerobic bottles: Gram Negative Rods  Preliminary Report (06 Feb 2018 18:32):    Growth in aerobic and anaerobic bottles: Escherichia coli    See previous culture [id number]    Culture - Urine (collected 05 Feb 2018 08:17)  Source: .Urine Clean Catch (Midstream)  Final Report (07 Feb 2018 09:10):    >100,000 CFU/ml Escherichia coli  Organism: Escherichia coli (07 Feb 2018 09:10)  Organism: Escherichia coli (07 Feb 2018 09:10) Full note to follow.  Pyelonephritis with E. coli septicemia  Urine isolate FQ sensitive, expect the blood isolate will be too.  Urine cx sensi will not be back until this evening  No ID objection to discharge on Ciprofloxacin 500mg PO Q12H  Left message for Dr. Beckford  Thank you for the courtesy of this referral.  Bahman Rosas MD  [phone]  ---------------------------  [facility], Division of Infectious Diseases  Elmer Rosas, JANIS Gonzalez, LUCILA Flores    DIONNE, NITHIN  67y, Female  [id number]    HPI--  67F with hx or RA on DMARD with MTX/leflunomide initially presented to the ED with fever, chills, and polyuria. Patient was diagnosed with pyelonephritis and discharged on PO cefruoxime. Patient was called back to the hospital with a positive blood culture. Patient felt back to baseline but returned to ER and was admitted. Here she was given ceftriaxone. She's asymptomatic. Her WBC has normalized. She has no fever. F/U blood cx are NTD. Urine Cx growing a relatively sensitive E. coli. Blood cx also with E. coli,, sensitivity not available yet. Patient tolerating PO without issues.      PMH/PSH--  Hypertension  Rheumatoid arthritis  H/O knee surgery      Allergies-- Gentamicin      Medications--  Antibiotics: cefTRIAXone   IVPB 1 Gram(s) IV Intermittent every 24 hours    Immunologic: methotrexate (Non - oncologic) 12.5 milliGRAM(s) Oral <User Schedule>    Other: acetaminophen   Tablet PRN  calcium carbonate 1250 mG + Vitamin D (OsCal 500 + D)  folic acid  ibuprofen  Tablet PRN  leflunomide  lisinopril  metoprolol succinate ER  prednisoLONE    Syrup 3 mG/mL (PRELONE)      Social History--  EtOH: denies   Tobacco: denies   Drug Use: denies     Family/Marital History--    No pertinent family history in first degree relatives  Remainder not relevant to clinical concern.      Review of Systems:  A >=10-point review of systems was obtained.   Review of systems otherwise negative except as previously noted.    Physical Exam--  Vital Signs: T(F): 97.9 (02-07-18 @ 05:00), Max: 98.2 (02-06-18 @ 20:57)  HR: 80 (02-07-18 @ 05:00)  BP: 140/79 (02-07-18 @ 05:00)  RR: 17 (02-07-18 @ 05:00)  SpO2: 99% (02-07-18 @ 05:00)  Wt(kg): --  General: Nontoxic-appearing Female in no acute distress.  HEENT: AT/NC. PERRL. EOMI. Anicteric. Conjunctiva pink and moist. Oropharynx clear.   Neck: Not rigid. No sense of mass.  Nodes: None palpable.  Lungs: Clear bilaterally without rales, wheezing or rhonchi  Heart: Regular rate and rhythm. No Murmur. No rub. No gallop. No palpable thrill.  Abdomen: Bowel sounds present and normoactive. Soft. Nondistended. Nontender. No sense of mass. No organomegaly.  Back: No spinal tenderness. No costovertebral angle tenderness.   Extremities: No cyanosis or clubbing. No edema.   Skin: Warm. Dry. Good turgor. No rash. No vasculitic stigmata.  Psychiatric: Appropriate affect and mood for situation.         Laboratory & Imaging Data--  CBC                        7.3    7.6   )-----------( 260      ( 07 Feb 2018 06:58 )             23.8   WBC Count: 13.3 K/uL (02.06.18 @ 04:04)  WBC Count: 15.6 K/uL (02.05.18 @ 22:51)      Chemistries  02-07    145  |  116<H>  |  8   ----------------------------<  85  3.7   |  21<L>  |  0.68    Creatinine, Serum: 0.80 mg/dL (02.06.18 @ 04:04)      Ca    7.6<L>      07 Feb 2018 06:58    TPro  6.2  /  Alb  2.7<L>  /  TBili  0.3  /  DBili  x   /  AST  33  /  ALT  27  /  AlkPhos  92  02-05    Urinalysis (02.05.18 @ 22:58)    pH Urine: 6.5    Glucose Qualitative, Urine: Negative    Blood, Urine: Small    Color: Yellow    Urine Appearance: Slightly Turbid    Bilirubin: Negative    Ketone - Urine: Negative    Specific Gravity: 1.005    Protein, Urine: Negative    Urobilinogen: Negative    Nitrite: Negative    Leukocyte Esterase Concentration: Small    Epithelial Cells: Few    Bacteria: Occasional    Red Blood Cell - Urine: 3-5 /HPF    White Blood Cell - Urine: 11-25    < from: CT Renal Stone Hunt (02.05.18 @ 22:28) >  FINDINGS:    There is mild bibasilar platelike atelectasis.    The unenhanced liver is unremarkable in appearance. The gallbladder is   normally distended. There are no radiodense gallstones. There is no   biliary ductal dilatation. The unenhanced pancreas, spleen, and adrenal   glands are unremarkable. The kidneys are symmetric in size. There is no   hydroureteronephrosis or obstructing urinary tract calculus. There is no   nephrolithiasis. There is mild asymmetric left-sided periureteralfat   stranding which may be reflective of an ascending urinary tract   infection. The urinary bladder walls are mildly thickened. There is   slight infiltration of the perivesicular fat likely reflective of a   cystitis. The uterus and adnexa are unremarkable.    There is no bowel obstruction, overt bowel wall thickening, or mesenteric   inflammatory change. A normal appendix is identified. There is a small   hiatus hernia. There is no pneumoperitoneum or ascites.    There is no significant abdominal or pelvic lymphadenopathy. The   abdominal aorta is normal in caliber. There is mild atherosclerotic   calcification of the aortoiliac tree.    There is a small fat-containing umbilical hernia.    There are mild degenerative changes of the spineand a hemangioma in the   T12 vertebral body.    IMPRESSION:    No obstructing urinary tract calculus, nephrolithiasis, or   hydroureteronephrosis. Mild asymmetric left-sided periureteral fat   stranding may be reflective of an ascending urinary tract infection. Mild   thickening of the urinary bladder walls with slight infiltration of the   perivesicular fat likely reflective of a cystitis.  < end of copied text >    Culture Data    Culture - Urine (collected 06 Feb 2018 09:16)  Source: .Urine Clean Catch (Midstream)  Final Report (07 Feb 2018 07:24):    No growth    Culture - Blood (collected 06 Feb 2018 08:56)  Source: .Blood Blood-Peripheral  Preliminary Report (07 Feb 2018 09:01):    No growth to date.    Culture - Blood (collected 06 Feb 2018 08:55)  Source: .Blood Blood-Peripheral  Preliminary Report (07 Feb 2018 09:01):    No growth to date.    Culture - Blood (collected 05 Feb 2018 08:27)  Source: .Blood Blood  Gram Stain (05 Feb 2018 20:13):    Growth in anaerobic bottle: Gram Negative Rods    Growth in aerobic bottle: Gram Negative Rods  Preliminary Report (06 Feb 2018 18:30):    Growth in aerobic and anaerobic bottles: Escherichia coli    ***Blood Panel PCR results on this specimen are available    approximately 3 hours after the Gram stain result.***    Gram stain, PCR, and/or culture results may not always    correspond due to difference in methodologies.    ************************************************************    This PCR assay was performed using [facility].    The following targets are tested for: Enterococcus,    vancomycin resistant enterococci, Listeria monocytogenes,    coagulase negative staphylococci, S. aureus,    methicillin resistant S. aureus, Streptococcus agalactiae    (Group B), S. pneumoniae, S. pyogenes (Group A),    Acinetobacter baumannii, Enterobacter cloacae, E. coli,    Klebsiella oxytoca, K. pneumoniae, Proteus sp.,    Serratia marcescens, Haemophilus influenzae,    Neisseria meningitidis, Pseudomonas aeruginosa, Candida    albicans, C. glabrata, C krusei, C parapsilosis,    C. tropicalis and the KPC resistance gene.  Organism: Blood Culture PCR (05 Feb 2018 19:12)  Organism: Blood Culture PCR (05 Feb 2018 19:12)    Culture - Blood (collected 05 Feb 2018 08:27)  Source: .Blood Blood  Gram Stain (05 Feb 2018 18:23):    Growth in aerobic and anaerobic bottles: Gram Negative Rods  Preliminary Report (06 Feb 2018 18:32):    Growth in aerobic and anaerobic bottles: Escherichia coli    See previous culture [id number]    Culture - Urine (02.05.18 @ 08:17)    -  Amikacin: S <=8    -  Ampicillin: R >16    -  Ampicillin/Sulbactam: R >16/8    -  Aztreonam: S <=4    -  Cefazolin: S 8    -  Cefepime: S <=2    -  Cefoxitin: S <=4    -  Ceftazidime: S <=1    -  Ceftriaxone: S <=1    -  Ciprofloxacin: S <=0.5    -  Ertapenem: S <=0.5    -  Gentamicin: S <=1    -  Imipenem: S <=1    -  Levofloxacin: S <=1    -  Meropenem: S <=1    -  Nitrofurantoin: S <=32    -  Piperacillin/Tazobactam: S <=8    -  Tobramycin: S <=2    -  Trimethoprim/Sulfamethoxazole: R >2/38    Specimen Source: .Urine Clean Catch (Midstream)    Culture Results:   >100,000 CFU/ml Escherichia coli    Organism Identification: Escherichia coli    Organism: Escherichia coli    Method Type: JEREMIAS

## 2018-02-07 NOTE — DISCHARGE NOTE ADULT - PLAN OF CARE
Clinical resolve Resolved Continue home meds Take Ciprofloxacin 500mg two times a day for 7 days. stable -ferrous sulfate 325mg tid  -follow up with your primary care doctor resolution EGD found to have 2 GI ulcers  Start Protonix and Carafate  Follow up with GI within 2-3 weeks

## 2018-02-07 NOTE — DISCHARGE NOTE ADULT - CARE PLAN
Principal Discharge DX:	Cystitis  Goal:	Clinical resolve  Assessment and plan of treatment:	Resolved  Secondary Diagnosis:	Essential hypertension  Assessment and plan of treatment:	Continue home meds  Secondary Diagnosis:	Rheumatoid arthritis  Assessment and plan of treatment:	Continue home meds Principal Discharge DX:	Cystitis  Goal:	Clinical resolve  Assessment and plan of treatment:	Take Ciprofloxacin 500mg two times a day for 7 days.  Secondary Diagnosis:	Essential hypertension  Assessment and plan of treatment:	Continue home meds  Secondary Diagnosis:	Rheumatoid arthritis  Assessment and plan of treatment:	Continue home meds Principal Discharge DX:	Cystitis  Goal:	Clinical resolve  Assessment and plan of treatment:	Take Ciprofloxacin 500mg two times a day for 7 days.  Secondary Diagnosis:	Essential hypertension  Goal:	stable  Assessment and plan of treatment:	Continue home meds  Secondary Diagnosis:	Rheumatoid arthritis  Goal:	stable  Assessment and plan of treatment:	Continue home meds  Secondary Diagnosis:	Other iron deficiency anemia  Goal:	stable  Assessment and plan of treatment:	-ferrous sulfate 325mg tid  -follow up with your primary care doctor Principal Discharge DX:	Cystitis  Goal:	Clinical resolve  Assessment and plan of treatment:	Take Ciprofloxacin 500mg two times a day for 7 days.  Secondary Diagnosis:	Essential hypertension  Goal:	stable  Assessment and plan of treatment:	Continue home meds  Secondary Diagnosis:	Rheumatoid arthritis  Goal:	stable  Assessment and plan of treatment:	Continue home meds  Secondary Diagnosis:	Other iron deficiency anemia  Goal:	stable  Assessment and plan of treatment:	-ferrous sulfate 325mg tid  -follow up with your primary care doctor  Secondary Diagnosis:	Gastric ulcer  Goal:	resolution  Assessment and plan of treatment:	EGD found to have 2 GI ulcers  Start Protonix and Carafate  Follow up with GI within 2-3 weeks

## 2018-02-07 NOTE — DISCHARGE NOTE ADULT - PATIENT PORTAL LINK FT
You can access the [facility] Patient Portal, offered by [facility], by registering with the following website: [email or web address]

## 2018-02-07 NOTE — CONSULT NOTE ADULT - PROBLEM SELECTOR RECOMMENDATION 9
daily cbc   transfuse prn   consider hematology eval  check iron studies   ppi once a day  check stool occult blood  further recommendations pending above     upper gastrointestinal endoscopy in am  ppi once a day   may need hematology consultation  if  upper gastrointestinal endoscopy negative will need a colonosocpy to be done

## 2018-02-07 NOTE — PROGRESS NOTE ADULT - SUBJECTIVE AND OBJECTIVE BOX
HPI:  Patient is a 67y old  Female who presents with a chief complaint of chills fever and frequent urination (06 Feb 2018 09:45)    INTERVAL HPI: Pt seen and examined at bedside.  No overnight events.  Urinary frequency has improved.  Denies dysuria, hematuria, suprapubic pain or flank pain.  Denies fever or chills, chest pain or SOB.    REVIEW OF SYSTEMS:    CONSTITUTIONAL: No weakness, fevers or chills  EYES/ENT: No visual changes, no throat pain   RESPIRATORY: No cough, wheezing, hemoptysis; No shortness of breath  CARDIOVASCULAR: No chest pain or palpitations  GASTROINTESTINAL: No abdominal, nausea, vomiting, or hematemesis; No diarrhea or constipation. No melena or hematochezia.  GENITOURINARY: No dysuria, frequency or hematuria  NEUROLOGICAL: No dizziness, numbness, or weakness  All other review of systems is negative unless indicated above.    VITAL SIGNS:  Vital Signs Last 24 Hrs  T(C): 36.6 (07 Feb 2018 05:00), Max: 36.8 (06 Feb 2018 20:57)  T(F): 97.9 (07 Feb 2018 05:00), Max: 98.2 (06 Feb 2018 20:57)  HR: 80 (07 Feb 2018 05:00) (80 - 94)  BP: 140/79 (07 Feb 2018 05:00) (133/69 - 149/82)  BP(mean): --  RR: 17 (07 Feb 2018 05:00) (17 - 18)  SpO2: 99% (07 Feb 2018 05:00) (98% - 99%)    PHYSICAL EXAM:     GENERAL: no acute distress  HEENT: NC/AT, EOMI, neck supple, MMM  RESPIRATORY: LCTAB/L, no rhonchi, rales, or wheezing  CARDIOVASCULAR: RRR, no murmurs, gallops, rubs  ABDOMINAL: soft, non-tender, non-distended, positive bowel sounds   EXTREMITIES: no clubbing, cyanosis, or edema  NEUROLOGICAL: alert and oriented x 3, non-focal  SKIN: no rashes or lesions   MUSCULOSKELETAL: no gross joint deformity                          7.3    7.6   )-----------( 260      ( 07 Feb 2018 06:58 )             23.8     02-07    145  |  116<H>  |  8   ----------------------------<  85  3.7   |  21<L>  |  0.68    Ca    7.6<L>      07 Feb 2018 06:58    TPro  6.2  /  Alb  2.7<L>  /  TBili  0.3  /  DBili  x   /  AST  33  /  ALT  27  /  AlkPhos  92  02-05      CAPILLARY BLOOD GLUCOSE          MEDICATIONS  (STANDING):  calcium carbonate 1250 mG + Vitamin D (OsCal 500 + D) 1 Tablet(s) Oral two times a day  cefTRIAXone   IVPB 1 Gram(s) IV Intermittent every 24 hours  folic acid 1 milliGRAM(s) Oral daily  leflunomide 10 milliGRAM(s) Oral daily  lisinopril 10 milliGRAM(s) Oral daily  methotrexate (Non - oncologic) 12.5 milliGRAM(s) Oral <User Schedule>  metoprolol succinate ER 50 milliGRAM(s) Oral daily  prednisoLONE    Syrup 3 mG/mL (PRELONE) 5 milliGRAM(s) Oral daily HPI:  Patient is a 67y old  Female who presents with a chief complaint of chills fever and frequent urination (06 Feb 2018 09:45)    INTERVAL HPI: Pt seen and examined at bedside.  No overnight events.  Urinary frequency has improved.  Denies dysuria, hematuria, suprapubic pain or flank pain.  Denies fever or chills, chest pain or SOB. Admits to some streaks of blood in stool at times, has a outpt colonoscopy on 2/12.    REVIEW OF SYSTEMS:    CONSTITUTIONAL: No weakness, fevers or chills  EYES/ENT: No visual changes, no throat pain   RESPIRATORY: No cough, wheezing, hemoptysis; No shortness of breath  CARDIOVASCULAR: No chest pain or palpitations  GASTROINTESTINAL: No abdominal, nausea, vomiting, or hematemesis; No diarrhea or constipation. No melena or hematochezia.  GENITOURINARY: No dysuria, frequency or hematuria  NEUROLOGICAL: No dizziness, numbness, or weakness  All other review of systems is negative unless indicated above.    VITAL SIGNS:  Vital Signs Last 24 Hrs  T(C): 36.6 (07 Feb 2018 05:00), Max: 36.8 (06 Feb 2018 20:57)  T(F): 97.9 (07 Feb 2018 05:00), Max: 98.2 (06 Feb 2018 20:57)  HR: 80 (07 Feb 2018 05:00) (80 - 94)  BP: 140/79 (07 Feb 2018 05:00) (133/69 - 149/82)  BP(mean): --  RR: 17 (07 Feb 2018 05:00) (17 - 18)  SpO2: 99% (07 Feb 2018 05:00) (98% - 99%)    PHYSICAL EXAM:     GENERAL: no acute distress  HEENT: NC/AT, EOMI, neck supple, MMM  RESPIRATORY: LCTAB/L, no rhonchi, rales, or wheezing  CARDIOVASCULAR: RRR, no murmurs, gallops, rubs  ABDOMINAL: soft, non-tender, non-distended, positive bowel sounds   EXTREMITIES: no clubbing, cyanosis, or edema  NEUROLOGICAL: alert and oriented x 3, non-focal  SKIN: no rashes or lesions   MUSCULOSKELETAL: no gross joint deformity                          7.3    7.6   )-----------( 260      ( 07 Feb 2018 06:58 )             23.8     02-07    145  |  116<H>  |  8   ----------------------------<  85  3.7   |  21<L>  |  0.68    Ca    7.6<L>      07 Feb 2018 06:58    TPro  6.2  /  Alb  2.7<L>  /  TBili  0.3  /  DBili  x   /  AST  33  /  ALT  27  /  AlkPhos  92  02-05      CAPILLARY BLOOD GLUCOSE          MEDICATIONS  (STANDING):  calcium carbonate 1250 mG + Vitamin D (OsCal 500 + D) 1 Tablet(s) Oral two times a day  cefTRIAXone   IVPB 1 Gram(s) IV Intermittent every 24 hours  folic acid 1 milliGRAM(s) Oral daily  leflunomide 10 milliGRAM(s) Oral daily  lisinopril 10 milliGRAM(s) Oral daily  methotrexate (Non - oncologic) 12.5 milliGRAM(s) Oral <User Schedule>  metoprolol succinate ER 50 milliGRAM(s) Oral daily  prednisoLONE    Syrup 3 mG/mL (PRELONE) 5 milliGRAM(s) Oral daily

## 2018-02-07 NOTE — DISCHARGE NOTE ADULT - HOSPITAL COURSE
68 y/o F with pmhx of HTN and RA who presented to the ED with complaints of increased urinary frequency, chills and mild lower back pain for 3-4 days, was sent home with PO abx for pyelo, now found to have GNR in blood cultures; admitted with cystitis.  In the ED, vitals were stable, WBC 15.6 H/h 8.2/26.6 Lactate 2.1 Blood cultures positive for GNR. Patient received 3L NS boluses, Rocephin x1. CT showed mild thickening of urinary bladder, negative for calculus or hydro.  RVP neg.  Pt treated for GNR with Rocephin.  Repeat BCx and UCx negative.  Pt symptoms resolved, clinically improved and stable for discharge home. 66 y/o F with pmhx of HTN and RA who presented to the ED with complaints of increased urinary frequency, chills and mild lower back pain for 3-4 days, was sent home with PO abx for pyelo, now found to have GNR in blood cultures; admitted with cystitis.  In the ED, vitals were stable, WBC 15.6 H/h 8.2/26.6 Lactate 2.1 Blood cultures positive for GNR. Patient received 3L NS boluses, Rocephin x1. CT showed mild thickening of urinary bladder, negative for calculus or hydro.  RVP neg.  Pt treated for GNR with Rocephin.  Repeat BCx and UCx negative.  Pt symptoms resolved, clinically improved and stable for discharge home on Ciprofloxacin for 7 days. 68 y/o F with pmhx of HTN and RA who presented to the ED with complaints of increased urinary frequency, chills and mild lower back pain for 3-4 days, was sent home with PO abx for pyelo, now found to have GNR in blood cultures; admitted with cystitis.  In the ED, vitals were stable, WBC 15.6 H/h 8.2/26.6 Lactate 2.1 Blood cultures positive for GNR. Patient received 3L NS boluses, Rocephin x1. CT showed mild thickening of urinary bladder, negative for calculus or hydro.  RVP neg.  Pt treated for GNR with Rocephin.  Repeat BCx and UCx negative.  Pt symptoms resolved, clinically improved and stable for discharge home on Ciprofloxacin for 7 days.    Time spent: 55 minutes	  PMD informed of discharge 68 y/o F with pmhx of HTN and RA who presented to the ED with complaints of increased urinary frequency, chills and mild lower back pain for 3-4 days, was sent home with PO abx for pyelo, now found to have GNR in blood cultures; admitted with cystitis.  In the ED, vitals were stable, WBC 15.6 H/h 8.2/26.6 Lactate 2.1 Blood cultures positive for GNR. Patient received 3L NS boluses, Rocephin x1. CT showed mild thickening of urinary bladder, negative for calculus or hydro.  RVP neg.  Pt treated for GNR with Rocephin.  Repeat BCx and UCx negative.  Pt symptoms resolved, clinically improved and stable for discharge home on Ciprofloxacin for 7 days. Pt was also found to be anemia, given iron supplemetns and continue folic acid. Was informed to have repeat cbc to assure h/h stable.     Time spent: 55 minutes	  PMD informed of discharge 66 y/o F with pmhx of HTN and RA who presented to the ED with complaints of increased urinary frequency, chills and mild lower back pain for 3-4 days, was sent home with PO abx for pyelo, now found to have GNR in blood cultures; admitted with cystitis.  In the ED, vitals were stable, WBC 15.6 H/h 8.2/26.6 Lactate 2.1 Blood cultures positive for GNR. Patient received 3L NS boluses, Rocephin x1. CT showed mild thickening of urinary bladder, negative for calculus or hydro.  RVP neg.  Pt treated for GNR with Rocephin.  Repeat BCx and UCx negative.  Pt symptoms resolved, clinically improved and stable for discharge home on Ciprofloxacin for 7 days. Pt was also found to be anemia, given iron supplemetns and continue folic acid. Was informed to have repeat cbc to assure h/h stable and have occult fecal test performed, pt verbalized understanding.     Time spent: 55 minutes	  PMD informed of discharge 66 y/o F with pmhx of HTN and RA who presented to the ED with complaints of increased urinary frequency, chills and mild lower back pain for 3-4 days, was sent home with PO abx for pyelo, now found to have GNR in blood cultures; admitted with cystitis.  In the ED, vitals were stable, WBC 15.6 H/h 8.2/26.6 Lactate 2.1 Blood cultures positive for GNR. Patient received 3L NS boluses, Rocephin x1. CT showed mild thickening of urinary bladder, negative for calculus or hydro.  RVP neg.  Pt treated for GNR with Rocephin.  Repeat BCx and UCx negative.  Pt symptoms resolved, clinically improved and stable for discharge home on Ciprofloxacin for 7 days. Pt was also found to be anemia, given iron supplemetns and continue folic acid. Was informed to have repeat cbc to assure h/h stable and have occult fecal test performed, pt verbalized understanding.     Time spent: 60 minutes	  PMD office made aware of discharge  PMD informed of discharge 68 y/o F with pmhx of HTN and RA who presented to the ED with complaints of increased urinary frequency, chills and mild lower back pain for 3-4 days, was sent home with PO abx for pyelo, now found to have GNR in blood cultures; admitted with cystitis.  In the ED, vitals were stable, WBC 15.6 H/h 8.2/26.6 Lactate 2.1 Blood cultures positive for GNR. Patient received 3L NS boluses, Rocephin x1. CT showed mild thickening of urinary bladder, negative for calculus or hydro.  RVP neg.  Pt treated for GNR with Rocephin.  Repeat BCx and UCx negative.  Pt symptoms resolved, clinically improved and stable for discharge home on Ciprofloxacin for 7 days. Pt was also found to have anemia, given iron supplements and continue folic acid. Was informed to have repeat cbc to assure h/h stable and have occult fecal test performed which was negative, pt verbalized understanding.  Pt went for EGD today, found to have 2 GI ulcers.  Pt stable to be discharged home with Protonix and Carafate.  Will follow up with GI Dr. Salvador within 2-3 weeks.    Time spent: 60 minutes	  PMD office made aware of discharge  PMD informed of discharge

## 2018-02-07 NOTE — DISCHARGE NOTE ADULT - PROVIDER TOKENS
TOKEDISON:'[id number]' TOKEN:'[id number]',TOKEN:'[id number]' TOKEN:'[id number]',TOKEN:'[id number]',TOKEN:'[id number]'

## 2018-02-07 NOTE — DISCHARGE NOTE ADULT - MEDICATION SUMMARY - MEDICATIONS TO TAKE
I will START or STAY ON the medications listed below when I get home from the hospital:    prednisoLONE 5 mg oral tablet  -- 1 tab(s) by mouth once a day  -- Indication: For Rheumatoid arthritis    lisinopril 10 mg oral tablet  -- 1 tab(s) by mouth once a day  -- Indication: For Essential hypertension    methotrexate  -- 12.5 milligram(s) by mouth once a week on Wednesday  -- Indication: For Rheumatoid arthritis    metoprolol succinate 50 mg oral tablet, extended release  -- 1 tab(s) by mouth once a day  -- Indication: For Essential hypertension    leflunomide 10 mg oral tablet  -- 1 tab(s) by mouth once a day  -- Indication: For Rheumatoid arthritis    ciprofloxacin 500 mg oral tablet  -- 1 tab(s) by mouth 2 times a day   -- Avoid prolonged or excessive exposure to direct and/or artificial sunlight while taking this medication.  Check with your doctor before becoming pregnant.  Do not take dairy products, antacids, or iron preparations within one hour of this medication.  Finish all this medication unless otherwise directed by prescriber.  Medication should be taken with plenty of water.    -- Indication: For UTI (urinary tract infection)    Calcium 600+D oral tablet  -- 1 tab(s) by mouth 2 times a day  -- Indication: For Need for prophylactic measure    folic acid 1 mg oral tablet  -- 1 tab(s) by mouth once a day  -- Indication: For Need for prophylactic measure I will START or STAY ON the medications listed below when I get home from the hospital:    prednisoLONE 5 mg oral tablet  -- 1 tab(s) by mouth once a day  -- Indication: For Rheumatoid arthritis    lisinopril 10 mg oral tablet  -- 1 tab(s) by mouth once a day  -- Indication: For Essential hypertension    methotrexate  -- 12.5 milligram(s) by mouth once a week on Wednesday  -- Indication: For Rheumatoid arthritis    metoprolol succinate 50 mg oral tablet, extended release  -- 1 tab(s) by mouth once a day  -- Indication: For Essential hypertension    leflunomide 10 mg oral tablet  -- 1 tab(s) by mouth once a day  -- Indication: For Rheumatoid arthritis    ferrous sulfate 325 mg (65 mg elemental iron) oral tablet  -- 1 tab(s) by mouth 3 times a day MDD:3 tabs  -- Check with your doctor before becoming pregnant.  Do not chew, break, or crush.  May discolor urine or feces.    -- Indication: For Anemia    ciprofloxacin 500 mg oral tablet  -- 1 tab(s) by mouth 2 times a day   -- Avoid prolonged or excessive exposure to direct and/or artificial sunlight while taking this medication.  Check with your doctor before becoming pregnant.  Do not take dairy products, antacids, or iron preparations within one hour of this medication.  Finish all this medication unless otherwise directed by prescriber.  Medication should be taken with plenty of water.    -- Indication: For UTI (urinary tract infection)    Calcium 600+D oral tablet  -- 1 tab(s) by mouth 2 times a day  -- Indication: For Need for prophylactic measure    folic acid 1 mg oral tablet  -- 1 tab(s) by mouth once a day  -- Indication: For Need for prophylactic measure I will START or STAY ON the medications listed below when I get home from the hospital:    prednisoLONE 5 mg oral tablet  -- 1 tab(s) by mouth once a day  -- Indication: For Rheumatoid arthritis    lisinopril 10 mg oral tablet  -- 1 tab(s) by mouth once a day  -- Indication: For Essential hypertension    methotrexate  -- 12.5 milligram(s) by mouth once a week on Wednesday  -- Indication: For Rheumatoid arthritis    metoprolol succinate 50 mg oral tablet, extended release  -- 1 tab(s) by mouth once a day  -- Indication: For Essential hypertension    leflunomide 10 mg oral tablet  -- 1 tab(s) by mouth once a day  -- Indication: For Rheumatoid arthritis    ferrous sulfate 325 mg (65 mg elemental iron) oral tablet  -- 1 tab(s) by mouth 3 times a day MDD:3 tabs  -- Check with your doctor before becoming pregnant.  Do not chew, break, or crush.  May discolor urine or feces.    -- Indication: For Anemia    Carafate 1 g oral tablet  -- 1 tab(s) by mouth 2 times a day   -- Do not take dairy products, antacids, or iron preparations within one hour of this medication.  It is very important that you take or use this exactly as directed.  Do not skip doses or discontinue unless directed by your doctor.  Take medication on an empty stomach 1 hour before or 2 to 3 hours after a meal unless otherwise directed by your doctor.    -- Indication: For GI ulcer    Protonix 40 mg oral delayed release tablet  -- 1 tab(s) by mouth once a day   -- It is very important that you take or use this exactly as directed.  Do not skip doses or discontinue unless directed by your doctor.  Obtain medical advice before taking any non-prescription drugs as some may affect the action of this medication.  Swallow whole.  Do not crush.    -- Indication: For GI ulcer    ciprofloxacin 500 mg oral tablet  -- 1 tab(s) by mouth 2 times a day   -- Avoid prolonged or excessive exposure to direct and/or artificial sunlight while taking this medication.  Check with your doctor before becoming pregnant.  Do not take dairy products, antacids, or iron preparations within one hour of this medication.  Finish all this medication unless otherwise directed by prescriber.  Medication should be taken with plenty of water.    -- Indication: For UTI (urinary tract infection)    Calcium 600+D oral tablet  -- 1 tab(s) by mouth 2 times a day  -- Indication: For Need for prophylactic measure    folic acid 1 mg oral tablet  -- 1 tab(s) by mouth once a day  -- Indication: For Need for prophylactic measure

## 2018-02-07 NOTE — PROGRESS NOTE ADULT - PROBLEM SELECTOR PLAN 3
WBC 7.6, resolved  likely elevated at baseline considering chronic steroid for RA plus may have had compounding leukocytosis in setting of bacteremia   Continue to trend CBC  Repeat blood and urine cultures NTD

## 2018-02-07 NOTE — CONSULT NOTE ADULT - SUBJECTIVE AND OBJECTIVE BOX
Chief Complaint:  Patient is a 67y old  Female who presents with a chief complaint of chills fever and frequent urination his is a 66 y/o F with pmhx of HTN and RA who presented to ED yesterday with complaint of increased urinary frequency, chills and mild lower back pain for 3-4 days, was sent home with PO abx for pyelo, now found to have GNR in blood cultures. Patient denies dysuria or hematuria. Denies any body aches, cough, fever, diarrhea or abdominal upset. Denies any sick contacts. Other than increased urinary frequency, does not endorse any acute complaints.     In the ED, vitals were stable. Labs significant for WBC 15.6 H/h 8.2/26.6 Lactate 2.1 Blood cultures positive for GNR. Patient received 3L NS boluses, Rocephin x1. CT showed mild thickening of urinary bladder, negative for calculus or hydro. EKG was NSR 89bpm.   while in hospital hgb dropped to 7.3 was to go home and was scheduled for an outpt colnosocpy  but now has had 2 episodes of melena and now she was lightheaded as well. no egd/colonoscopy in her history she is vegetrian and takes no iron tabl;ets in her diet  Allergies:  gentamicin (Chills)    Medications:  acetaminophen   Tablet 650 milliGRAM(s) Oral every 6 hours PRN  aluminum hydroxide/magnesium hydroxide/simethicone Suspension 30 milliLiter(s) Oral every 4 hours PRN  calcium carbonate 1250 mG + Vitamin D (OsCal 500 + D) 1 Tablet(s) Oral two times a day  cefTRIAXone   IVPB 1 Gram(s) IV Intermittent every 24 hours  folic acid 1 milliGRAM(s) Oral daily  ibuprofen  Tablet 600 milliGRAM(s) Oral every 6 hours PRN  leflunomide 10 milliGRAM(s) Oral daily  lisinopril 10 milliGRAM(s) Oral daily  methotrexate (Non - oncologic) 12.5 milliGRAM(s) Oral <User Schedule>  metoprolol succinate ER 50 milliGRAM(s) Oral daily  pantoprazole  Injectable 40 milliGRAM(s) IV Push daily  prednisoLONE    Syrup 3 mG/mL (PRELONE) 5 milliGRAM(s) Oral daily      PMHX/PSHX:  Hypertension  Rheumatoid arthritis  H/O knee surgery      Family history:  No pertinent family history in first degree relatives      Social History:     ROS:     General:  No wt loss, fevers, chills, night sweats, fatigue,   Eyes:  Good vision, no reported pain  ENT:  No sore throat, pain, runny nose, dysphagia  CV:  No pain, palpitations, hypo/hypertension  Resp:  No dyspnea, cough, tachypnea, wheezing  GI:  No pain, No nausea, No vomiting, No diarrhea, No constipation, No weight loss, No fever, No pruritis, No rectal bleeding, No tarry stools, No dysphagia,  :  No pain, bleeding, incontinence, nocturia  Muscle:  No pain, weakness  Neuro:  No weakness, tingling, memory problems  Psych:  No fatigue, insomnia, mood problems, depression  Endocrine:  No polyuria, polydipsia, cold/heat intolerance  Heme:  No petechiae, ecchymosis, easy bruisability  Skin:  No rash, tattoos, scars, edema      PHYSICAL EXAM:   Vital Signs:  Vital Signs Last 24 Hrs  T(C): 36.6 (2018 14:00), Max: 36.8 (2018 20:57)  T(F): 97.9 (2018 14:00), Max: 98.2 (2018 20:57)  HR: 81 (2018 14:00) (80 - 81)  BP: 148/82 (2018 14:00) (140/79 - 149/82)  BP(mean): --  RR: 17 (2018 14:00) (17 - 17)  SpO2: 99% (2018 14:00) (99% - 99%)  Daily     Daily Weight in k.5 (2018 05:00)    GENERAL:  Appears stated age, well-groomed, well-nourished, no distress  HEENT:  NC/AT,  conjunctivae clear and pink, no thyromegaly, nodules, adenopathy, no JVD, sclera -anicteric  CHEST:  Full & symmetric excursion, no increased effort, breath sounds clear  HEART:  Regular rhythm, S1, S2, no murmur/rub/S3/S4, no abdominal bruit, no edema  ABDOMEN:  Soft, non-tender, non-distended, normoactive bowel sounds,  no masses ,no hepato-splenomegaly, no signs of chronic liver disease  EXTEREMITIES:  no cyanosis,clubbing or edema  SKIN:  No rash/erythema/ecchymoses/petechiae/wounds/abscess/warm/dry  NEURO:  Alert, oriented, no asterixis, no tremor, no encephalopathy    LABS:                        7.3    7.6   )-----------( 260      ( 2018 06:58 )             23.8     02-    145  |  116<H>  |  8   ----------------------------<  85  3.7   |  21<L>  |  0.68    Ca    7.6<L>      2018 06:58    TPro  6.2  /  Alb  2.7<L>  /  TBili  0.3  /  DBili  x   /  AST  33  /  ALT  27  /  AlkPhos  92  02-05    LIVER FUNCTIONS - ( 2018 22:51 )  Alb: 2.7 g/dL / Pro: 6.2 g/dL / ALK PHOS: 92 U/L / ALT: 27 U/L / AST: 33 U/L / GGT: x           PT/INR - ( 2018 22:51 )   PT: 14.4 sec;   INR: 1.31 ratio         PTT - ( 2018 22:51 )  PTT:29.2 sec  Urinalysis Basic - ( 2018 22:58 )    Color: Yellow / Appearance: Slightly Turbid / S.005 / pH: x  Gluc: x / Ketone: Negative  / Bili: Negative / Urobili: Negative   Blood: x / Protein: Negative / Nitrite: Negative   Leuk Esterase: Small / RBC: 3-5 /HPF / WBC 11-25   Sq Epi: x / Non Sq Epi: Few / Bacteria: Occasional          Imaging:

## 2018-02-07 NOTE — DISCHARGE NOTE ADULT - CARE PROVIDERS DIRECT ADDRESSES
[email or web address],[email or web address],DirectAddress_Unknown,DirectAddress_Unknown

## 2018-02-07 NOTE — DISCHARGE NOTE ADULT - ADDITIONAL INSTRUCTIONS
Follow up with PCP within 1 week Follow up with PCP within 1 week of discharge  follow up with ID within 1 week of discharge  pt to make all follow up appts Follow up with PCP within 2-3 days of discharge  follow up with ID within 1 week of discharge  pt to make all follow up appts Follow up with PCP within 2-3 days of discharge  follow up with Dr. perez GI within 2-3 weeks  follow up with ID within 1 week of discharge  pt to make all follow up appts

## 2018-02-08 ENCOUNTER — RESULT REVIEW (OUTPATIENT)
Age: 68
End: 2018-02-08

## 2018-02-08 ENCOUNTER — TRANSCRIPTION ENCOUNTER (OUTPATIENT)
Age: 68
End: 2018-02-08

## 2018-02-08 VITALS
DIASTOLIC BLOOD PRESSURE: 77 MMHG | HEART RATE: 71 BPM | OXYGEN SATURATION: 99 % | TEMPERATURE: 98 F | RESPIRATION RATE: 15 BRPM | SYSTOLIC BLOOD PRESSURE: 170 MMHG

## 2018-02-08 DIAGNOSIS — N10 ACUTE PYELONEPHRITIS: ICD-10-CM

## 2018-02-08 DIAGNOSIS — R78.81 BACTEREMIA: ICD-10-CM

## 2018-02-08 LAB
ANION GAP SERPL CALC-SCNC: 8 MMOL/L — SIGNIFICANT CHANGE UP (ref 5–17)
BUN SERPL-MCNC: 10 MG/DL — SIGNIFICANT CHANGE UP (ref 7–23)
CALCIUM SERPL-MCNC: 8.5 MG/DL — SIGNIFICANT CHANGE UP (ref 8.5–10.1)
CHLORIDE SERPL-SCNC: 115 MMOL/L — HIGH (ref 96–108)
CO2 SERPL-SCNC: 23 MMOL/L — SIGNIFICANT CHANGE UP (ref 22–31)
CREAT SERPL-MCNC: 0.79 MG/DL — SIGNIFICANT CHANGE UP (ref 0.5–1.3)
GLUCOSE SERPL-MCNC: 102 MG/DL — HIGH (ref 70–99)
HCT VFR BLD CALC: 26.2 % — LOW (ref 34.5–45)
HGB BLD-MCNC: 8.1 G/DL — LOW (ref 11.5–15.5)
MCHC RBC-ENTMCNC: 28.8 PG — SIGNIFICANT CHANGE UP (ref 27–34)
MCHC RBC-ENTMCNC: 30.7 GM/DL — LOW (ref 32–36)
MCV RBC AUTO: 93.7 FL — SIGNIFICANT CHANGE UP (ref 80–100)
OB PNL STL: NEGATIVE — SIGNIFICANT CHANGE UP
PLATELET # BLD AUTO: 294 K/UL — SIGNIFICANT CHANGE UP (ref 150–400)
POTASSIUM SERPL-MCNC: 4.1 MMOL/L — SIGNIFICANT CHANGE UP (ref 3.5–5.3)
POTASSIUM SERPL-SCNC: 4.1 MMOL/L — SIGNIFICANT CHANGE UP (ref 3.5–5.3)
RBC # BLD: 2.8 M/UL — LOW (ref 3.8–5.2)
RBC # FLD: 16.9 % — HIGH (ref 10.3–14.5)
SODIUM SERPL-SCNC: 146 MMOL/L — HIGH (ref 135–145)
WBC # BLD: 6.5 K/UL — SIGNIFICANT CHANGE UP (ref 3.8–10.5)
WBC # FLD AUTO: 6.5 K/UL — SIGNIFICANT CHANGE UP (ref 3.8–10.5)

## 2018-02-08 PROCEDURE — 83550 IRON BINDING TEST: CPT

## 2018-02-08 PROCEDURE — 96376 TX/PRO/DX INJ SAME DRUG ADON: CPT

## 2018-02-08 PROCEDURE — 82272 OCCULT BLD FECES 1-3 TESTS: CPT

## 2018-02-08 PROCEDURE — 99239 HOSP IP/OBS DSCHRG MGMT >30: CPT

## 2018-02-08 PROCEDURE — 96365 THER/PROPH/DIAG IV INF INIT: CPT

## 2018-02-08 PROCEDURE — 88312 SPECIAL STAINS GROUP 1: CPT

## 2018-02-08 PROCEDURE — 88313 SPECIAL STAINS GROUP 2: CPT

## 2018-02-08 PROCEDURE — 74176 CT ABD & PELVIS W/O CONTRAST: CPT

## 2018-02-08 PROCEDURE — 80048 BASIC METABOLIC PNL TOTAL CA: CPT

## 2018-02-08 PROCEDURE — 88305 TISSUE EXAM BY PATHOLOGIST: CPT | Mod: 26

## 2018-02-08 PROCEDURE — 85018 HEMOGLOBIN: CPT

## 2018-02-08 PROCEDURE — 88305 TISSUE EXAM BY PATHOLOGIST: CPT

## 2018-02-08 PROCEDURE — 88313 SPECIAL STAINS GROUP 2: CPT | Mod: 26

## 2018-02-08 PROCEDURE — 82728 ASSAY OF FERRITIN: CPT

## 2018-02-08 PROCEDURE — 85027 COMPLETE CBC AUTOMATED: CPT

## 2018-02-08 PROCEDURE — 88312 SPECIAL STAINS GROUP 1: CPT | Mod: 26

## 2018-02-08 PROCEDURE — 82746 ASSAY OF FOLIC ACID SERUM: CPT

## 2018-02-08 PROCEDURE — 96375 TX/PRO/DX INJ NEW DRUG ADDON: CPT

## 2018-02-08 PROCEDURE — 85014 HEMATOCRIT: CPT

## 2018-02-08 PROCEDURE — 82607 VITAMIN B-12: CPT

## 2018-02-08 PROCEDURE — 99285 EMERGENCY DEPT VISIT HI MDM: CPT | Mod: 25

## 2018-02-08 PROCEDURE — 93005 ELECTROCARDIOGRAM TRACING: CPT

## 2018-02-08 RX ORDER — PANTOPRAZOLE SODIUM 20 MG/1
1 TABLET, DELAYED RELEASE ORAL
Qty: 30 | Refills: 0 | OUTPATIENT
Start: 2018-02-08 | End: 2018-03-09

## 2018-02-08 RX ORDER — SUCRALFATE 1 G
1 TABLET ORAL
Qty: 120 | Refills: 0
Start: 2018-02-08 | End: 2018-03-09

## 2018-02-08 RX ORDER — SUCRALFATE 1 G
1 TABLET ORAL
Qty: 0 | Refills: 0 | Status: DISCONTINUED | OUTPATIENT
Start: 2018-02-08 | End: 2018-02-08

## 2018-02-08 RX ORDER — SUCRALFATE 1 G
1 TABLET ORAL
Qty: 60 | Refills: 0 | OUTPATIENT
Start: 2018-02-08 | End: 2018-03-09

## 2018-02-08 RX ORDER — PANTOPRAZOLE SODIUM 20 MG/1
1 TABLET, DELAYED RELEASE ORAL
Qty: 60 | Refills: 0
Start: 2018-02-08 | End: 2018-03-09

## 2018-02-08 RX ADMIN — PANTOPRAZOLE SODIUM 40 MILLIGRAM(S): 20 TABLET, DELAYED RELEASE ORAL at 16:03

## 2018-02-08 RX ADMIN — Medication 1 MILLIGRAM(S): at 16:03

## 2018-02-08 RX ADMIN — Medication 5 MILLIGRAM(S): at 05:42

## 2018-02-08 RX ADMIN — LISINOPRIL 10 MILLIGRAM(S): 2.5 TABLET ORAL at 05:41

## 2018-02-08 RX ADMIN — Medication 50 MILLIGRAM(S): at 05:41

## 2018-02-08 RX ADMIN — Medication 1 TABLET(S): at 18:46

## 2018-02-08 RX ADMIN — Medication 1 GRAM(S): at 18:46

## 2018-02-08 RX ADMIN — LEFLUNOMIDE 10 MILLIGRAM(S): 10 TABLET ORAL at 16:04

## 2018-02-08 NOTE — PROGRESS NOTE ADULT - PROBLEM SELECTOR PLAN 3
WBC 6.5, resolved  likely elevated at baseline considering chronic steroid for RA plus may have had compounding leukocytosis in setting of bacteremia   Continue to trend CBC  Repeat blood and urine cultures NTD

## 2018-02-08 NOTE — PROGRESS NOTE ADULT - SUBJECTIVE AND OBJECTIVE BOX
HPI:  Patient is a 67y old  Female who presents with a chief complaint of chills fever and frequent urination (06 Feb 2018 09:45)    INTERVAL HPI: Pt seen and examined at bedside.  No overnight events.  Urinary frequency has improved.  Denies dysuria, hematuria, suprapubic pain or flank pain.  C/o mild sore throat.  Denies fever or chills, chest pain or SOB.  Pt to go for EGD today.  Has outpatient colonoscopy scheduled for 2/12.    REVIEW OF SYSTEMS:    CONSTITUTIONAL: No weakness, fevers or chills  EYES/ENT: No visual changes, +mild sore throat   RESPIRATORY: No cough, wheezing, hemoptysis; No shortness of breath  CARDIOVASCULAR: No chest pain or palpitations  GASTROINTESTINAL: No abdominal, nausea, vomiting, or hematemesis; No diarrhea or constipation. No melena or hematochezia.  GENITOURINARY: No dysuria, frequency or hematuria  NEUROLOGICAL: No dizziness, numbness, or weakness  SKIN: No itching, burning, rashes, or lesions   All other review of systems is negative unless indicated above.    VITAL SIGNS:  Vital Signs Last 24 Hrs  T(C): 37.1 (08 Feb 2018 04:56), Max: 37.1 (08 Feb 2018 04:56)  T(F): 98.7 (08 Feb 2018 04:56), Max: 98.7 (08 Feb 2018 04:56)  HR: 98 (08 Feb 2018 04:56) (81 - 98)  BP: 163/86 (08 Feb 2018 04:56) (145/79 - 163/86)  BP(mean): --  RR: 18 (08 Feb 2018 04:56) (17 - 18)  SpO2: 97% (08 Feb 2018 04:56) (97% - 99%)    PHYSICAL EXAM:     GENERAL: no acute distress  HEENT: NC/AT, EOMI, neck supple, MMM  RESPIRATORY: LCTAB/L, no rhonchi, rales, or wheezing  CARDIOVASCULAR: RRR, no murmurs, gallops, rubs  ABDOMINAL: soft, non-tender, non-distended, positive bowel sounds   EXTREMITIES: no clubbing, cyanosis, or edema  NEUROLOGICAL: alert and oriented x 3, non-focal  SKIN: no rashes or lesions   MUSCULOSKELETAL: no gross joint deformity                          8.1    6.5   )-----------( 294      ( 08 Feb 2018 10:00 )             26.2     02-08    146<H>  |  115<H>  |  10  ----------------------------<  102<H>  4.1   |  23  |  0.79    Ca    8.5      08 Feb 2018 10:00        CAPILLARY BLOOD GLUCOSE          MEDICATIONS  (STANDING):  calcium carbonate 1250 mG + Vitamin D (OsCal 500 + D) 1 Tablet(s) Oral two times a day  cefTRIAXone   IVPB 1 Gram(s) IV Intermittent every 24 hours  folic acid 1 milliGRAM(s) Oral daily  leflunomide 10 milliGRAM(s) Oral daily  lisinopril 10 milliGRAM(s) Oral daily  methotrexate (Non - oncologic) 12.5 milliGRAM(s) Oral <User Schedule>  metoprolol succinate ER 50 milliGRAM(s) Oral daily  pantoprazole  Injectable 40 milliGRAM(s) IV Push daily  prednisoLONE    Syrup 3 mG/mL (PRELONE) 5 milliGRAM(s) Oral daily

## 2018-02-08 NOTE — PROGRESS NOTE ADULT - PROBLEM SELECTOR PLAN 2
Ciprofloxacin 500mg PO Q12H x7days    From an ID standpoint no further requirement for inpatient status for the management of ID issues. Fine with discharge from ID standpoint when other medical issues no longer require inpatient care and social issues allow for a safe discharge plan.    Thank you for consulting us and involving us in the management of this most interesting and challenging case.     Please Call with any further questions
+ ecoli, follow further sensitivity.   c/w rocephin.

## 2018-02-08 NOTE — PROGRESS NOTE ADULT - PROBLEM SELECTOR PLAN 1
Ciprofloxacin 500mg PO Q12H x7days
Likely secondary to cystitis; GNR in blood cultures obtained from ED on 2/5   Continue Rocephin  Repeat blood and urine cultures Neg to date  Trend CBC, and BMP  Tylenol prn fever and mild pain  Off IVF, tolerating PO diet
Likely secondary to cystitis; GNR in blood cultures obtained from ED on 2/5   Continue Rocephin  Repeat blood and urine cultures Neg to date  Trend CBC, and BMP  Tylenol prn fever and mild pain  Off IVF, tolerating PO diet
likely secondary to cystitis; GNR in blood cultures obtained from ED on 2/5   continue Rocephin  F/u Repeat blood and urine cultures  Trend CBC, and BMP  Tylenol prn fever and mild pain  D/c IVF, tolerating po diet

## 2018-02-08 NOTE — PROGRESS NOTE ADULT - SUBJECTIVE AND OBJECTIVE BOX
infectious diseases progress note:    NITHIN TRUONG is a 67y y. o. Female patient    Patient reports: no new issues, ready to go today    ROS:    EYES:  Negative  blurry vision or double vision  GASTROINTESTINAL:  Negative for nausea, vomiting, diarrhea  -otherwise negative except for subjective    Allergies    gentamicin (Chills)    Intolerances        ANTIBIOTICS/RELEVANT:  antimicrobials  cefTRIAXone   IVPB 1 Gram(s) IV Intermittent every 24 hours    immunologic:  methotrexate (Non - oncologic) 12.5 milliGRAM(s) Oral <User Schedule>    OTHER:  acetaminophen   Tablet 650 milliGRAM(s) Oral every 6 hours PRN  aluminum hydroxide/magnesium hydroxide/simethicone Suspension 30 milliLiter(s) Oral every 4 hours PRN  calcium carbonate 1250 mG + Vitamin D (OsCal 500 + D) 1 Tablet(s) Oral two times a day  folic acid 1 milliGRAM(s) Oral daily  ibuprofen  Tablet 600 milliGRAM(s) Oral every 6 hours PRN  leflunomide 10 milliGRAM(s) Oral daily  lisinopril 10 milliGRAM(s) Oral daily  metoprolol succinate ER 50 milliGRAM(s) Oral daily  pantoprazole  Injectable 40 milliGRAM(s) IV Push daily  prednisoLONE    Syrup 3 mG/mL (PRELONE) 5 milliGRAM(s) Oral daily      Objective:  Vital Signs Last 24 Hrs  T(C): 37.1 (08 Feb 2018 04:56), Max: 37.1 (08 Feb 2018 04:56)  T(F): 98.7 (08 Feb 2018 04:56), Max: 98.7 (08 Feb 2018 04:56)  HR: 98 (08 Feb 2018 04:56) (81 - 98)  BP: 163/86 (08 Feb 2018 04:56) (145/79 - 163/86)  BP(mean): --  RR: 18 (08 Feb 2018 04:56) (17 - 18)  SpO2: 97% (08 Feb 2018 04:56) (97% - 99%)    T(C): 37.1 (02-08-18 @ 04:56), Max: 37.1 (02-08-18 @ 04:56)  T(C): 37.1 (02-08-18 @ 04:56), Max: 37.4 (02-06-18 @ 05:39)  T(C): 37.1 (02-08-18 @ 04:56), Max: 38.8 (02-05-18 @ 02:09)    PHYSICAL EXAM:  Constitutional:Well-developed, well nourished  Eyes:PERRLA, EOMI  Ear/Nose/Throat: oropharynx normal	  Neck:no JVD, no lymphadenopathy, supple  Respiratory: no accessory muscle use  Cardiovascular:RRR,   Gastrointestinal:soft, NT  Extremities:no clubbing, no cyanosis, edema absent      LABS:                        8.1    x     )-----------( x        ( 07 Feb 2018 18:54 )             25.7       7.6 02-07 @ 06:58  13.3 02-06 @ 04:04  15.6 02-05 @ 22:51  13.0 02-05 @ 03:14      02-07    145  |  116<H>  |  8   ----------------------------<  85  3.7   |  21<L>  |  0.68    Ca    7.6<L>      07 Feb 2018 06:58              MICROBIOLOGY:          RADIOLOGY & ADDITIONAL STUDIES:

## 2018-02-08 NOTE — PROGRESS NOTE ADULT - ASSESSMENT
68 yo female with E. coli septicemia  Secondary to pyelonephritis
66 y/o F with pmhx of HTN and RA who presented to ED yesterday with complaint of increased urinary frequency, chills and mild lower back pain for 3-4 days, was sent home with PO abx for pyelo, now found to have GNR in blood cultures; admitted with cystitis.
68 y/o F with pmhx of HTN and RA who presented to ED yesterday with complaint of increased urinary frequency, chills and mild lower back pain for 3-4 days, was sent home with PO abx for pyelo, now found to have GNR in blood cultures; admitted with cystitis.
68 y/o F with pmhx of HTN and RA who presented to ED yesterday with complaint of increased urinary frequency, chills and mild lower back pain for 3-4 days, was sent home with PO abx for pyelo, now found to have GNR in blood cultures; admitted with cystitis.

## 2018-02-08 NOTE — PROGRESS NOTE ADULT - PROBLEM SELECTOR PLAN 4
Hgb 8.1 today, probably Ch, will go for EGD today to r/o UGIB  Iron panel shows dec total iron, MCV 93.7, Folate and B12 WNL  Continue Iron 325  Monitor CBC

## 2018-02-08 NOTE — PROGRESS NOTE ADULT - PROBLEM SELECTOR PROBLEM 2
Bacteremia due to Escherichia coli
UTI (urinary tract infection)

## 2018-02-11 LAB
CULTURE RESULTS: SIGNIFICANT CHANGE UP
CULTURE RESULTS: SIGNIFICANT CHANGE UP
SPECIMEN SOURCE: SIGNIFICANT CHANGE UP
SPECIMEN SOURCE: SIGNIFICANT CHANGE UP

## 2018-10-10 PROBLEM — I10 ESSENTIAL (PRIMARY) HYPERTENSION: Chronic | Status: ACTIVE | Noted: 2018-02-05

## 2018-10-10 PROBLEM — M06.9 RHEUMATOID ARTHRITIS, UNSPECIFIED: Chronic | Status: ACTIVE | Noted: 2018-02-05

## 2018-11-16 ENCOUNTER — APPOINTMENT (OUTPATIENT)
Dept: GASTROENTEROLOGY | Facility: CLINIC | Age: 68
End: 2018-11-16
Payer: MEDICAID

## 2018-11-16 VITALS
HEIGHT: 65.5 IN | OXYGEN SATURATION: 99 % | DIASTOLIC BLOOD PRESSURE: 82 MMHG | BODY MASS INDEX: 25.19 KG/M2 | SYSTOLIC BLOOD PRESSURE: 110 MMHG | WEIGHT: 153 LBS | HEART RATE: 77 BPM | TEMPERATURE: 97.6 F

## 2018-11-16 DIAGNOSIS — R12 HEARTBURN: ICD-10-CM

## 2018-11-16 PROCEDURE — 99203 OFFICE O/P NEW LOW 30 MIN: CPT

## 2018-12-06 ENCOUNTER — APPOINTMENT (OUTPATIENT)
Dept: GASTROENTEROLOGY | Facility: AMBULATORY MEDICAL SERVICES | Age: 68
End: 2018-12-06

## 2018-12-20 ENCOUNTER — TRANSCRIPTION ENCOUNTER (OUTPATIENT)
Age: 68
End: 2018-12-20

## 2018-12-21 ENCOUNTER — OUTPATIENT (OUTPATIENT)
Dept: OUTPATIENT SERVICES | Facility: HOSPITAL | Age: 68
LOS: 1 days | End: 2018-12-21
Payer: MEDICAID

## 2018-12-21 ENCOUNTER — APPOINTMENT (OUTPATIENT)
Dept: GASTROENTEROLOGY | Facility: HOSPITAL | Age: 68
End: 2018-12-21
Payer: MEDICAID

## 2018-12-21 DIAGNOSIS — D63.8 ANEMIA IN OTHER CHRONIC DISEASES CLASSIFIED ELSEWHERE: ICD-10-CM

## 2018-12-21 DIAGNOSIS — Z98.890 OTHER SPECIFIED POSTPROCEDURAL STATES: Chronic | ICD-10-CM

## 2018-12-21 DIAGNOSIS — K21.9 GASTRO-ESOPHAGEAL REFLUX DISEASE WITHOUT ESOPHAGITIS: ICD-10-CM

## 2018-12-21 PROCEDURE — 43235 EGD DIAGNOSTIC BRUSH WASH: CPT

## 2018-12-24 ENCOUNTER — OTHER (OUTPATIENT)
Age: 68
End: 2018-12-24

## 2019-01-04 ENCOUNTER — APPOINTMENT (OUTPATIENT)
Dept: RHEUMATOLOGY | Facility: CLINIC | Age: 69
End: 2019-01-04

## 2019-02-25 ENCOUNTER — APPOINTMENT (OUTPATIENT)
Dept: CARDIOLOGY | Facility: CLINIC | Age: 69
End: 2019-02-25
Payer: MEDICAID

## 2019-02-25 ENCOUNTER — CLINICAL ADVICE (OUTPATIENT)
Age: 69
End: 2019-02-25

## 2019-02-25 ENCOUNTER — NON-APPOINTMENT (OUTPATIENT)
Age: 69
End: 2019-02-25

## 2019-02-25 VITALS — DIASTOLIC BLOOD PRESSURE: 79 MMHG | OXYGEN SATURATION: 100 % | HEART RATE: 72 BPM | SYSTOLIC BLOOD PRESSURE: 170 MMHG

## 2019-02-25 PROCEDURE — 36415 COLL VENOUS BLD VENIPUNCTURE: CPT

## 2019-02-25 PROCEDURE — 93000 ELECTROCARDIOGRAM COMPLETE: CPT

## 2019-02-25 PROCEDURE — 99214 OFFICE O/P EST MOD 30 MIN: CPT | Mod: 25

## 2019-02-28 LAB
25(OH)D3 SERPL-MCNC: 18.5 NG/ML
ALBUMIN SERPL ELPH-MCNC: 4.1 G/DL
ALP BLD-CCNC: 97 U/L
ALT SERPL-CCNC: 16 U/L
ANION GAP SERPL CALC-SCNC: 11 MMOL/L
AST SERPL-CCNC: 21 U/L
BASOPHILS # BLD AUTO: 0.1 K/UL
BASOPHILS NFR BLD AUTO: 1.5 %
BILIRUB SERPL-MCNC: 0.2 MG/DL
BUN SERPL-MCNC: 16 MG/DL
CALCIUM SERPL-MCNC: 9.2 MG/DL
CHLORIDE SERPL-SCNC: 109 MMOL/L
CHOLEST SERPL-MCNC: 236 MG/DL
CHOLEST/HDLC SERPL: 2.7 RATIO
CO2 SERPL-SCNC: 24 MMOL/L
CREAT SERPL-MCNC: 0.8 MG/DL
EOSINOPHIL # BLD AUTO: 0.34 K/UL
EOSINOPHIL NFR BLD AUTO: 5 %
GLUCOSE SERPL-MCNC: 138 MG/DL
HBA1C MFR BLD HPLC: 5.6 %
HCT VFR BLD CALC: 36.8 %
HDLC SERPL-MCNC: 87 MG/DL
HGB BLD-MCNC: 10.9 G/DL
IMM GRANULOCYTES NFR BLD AUTO: 0.7 %
LDLC SERPL CALC-MCNC: 109 MG/DL
LYMPHOCYTES # BLD AUTO: 1.17 K/UL
LYMPHOCYTES NFR BLD AUTO: 17.1 %
MAN DIFF?: NORMAL
MCHC RBC-ENTMCNC: 29.6 GM/DL
MCHC RBC-ENTMCNC: 29.9 PG
MCV RBC AUTO: 100.8 FL
MONOCYTES # BLD AUTO: 0.64 K/UL
MONOCYTES NFR BLD AUTO: 9.3 %
NEUTROPHILS # BLD AUTO: 4.56 K/UL
NEUTROPHILS NFR BLD AUTO: 66.4 %
PLATELET # BLD AUTO: 365 K/UL
POTASSIUM SERPL-SCNC: 4.9 MMOL/L
PROT SERPL-MCNC: 7 G/DL
RBC # BLD: 3.65 M/UL
RBC # FLD: 15.8 %
SODIUM SERPL-SCNC: 144 MMOL/L
TRIGL SERPL-MCNC: 201 MG/DL
TSH SERPL-ACNC: 1.75 UIU/ML
WBC # FLD AUTO: 6.86 K/UL

## 2019-03-04 ENCOUNTER — APPOINTMENT (OUTPATIENT)
Dept: INTERNAL MEDICINE | Facility: CLINIC | Age: 69
End: 2019-03-04
Payer: MEDICAID

## 2019-03-04 VITALS
WEIGHT: 162 LBS | OXYGEN SATURATION: 99 % | TEMPERATURE: 97.8 F | HEART RATE: 76 BPM | HEIGHT: 65 IN | BODY MASS INDEX: 26.99 KG/M2 | RESPIRATION RATE: 18 BRPM | DIASTOLIC BLOOD PRESSURE: 60 MMHG | SYSTOLIC BLOOD PRESSURE: 120 MMHG

## 2019-03-04 PROCEDURE — 99204 OFFICE O/P NEW MOD 45 MIN: CPT | Mod: 25

## 2019-03-04 PROCEDURE — 94729 DIFFUSING CAPACITY: CPT

## 2019-03-04 PROCEDURE — ZZZZZ: CPT

## 2019-03-04 PROCEDURE — G0444 DEPRESSION SCREEN ANNUAL: CPT

## 2019-03-04 PROCEDURE — 94664 DEMO&/EVAL PT USE INHALER: CPT

## 2019-03-04 PROCEDURE — 94010 BREATHING CAPACITY TEST: CPT

## 2019-03-04 NOTE — HEALTH RISK ASSESSMENT
[0] : 2) Feeling down, depressed, or hopeless: Not at all (0) [Patient reported mammogram was normal] : Patient reported mammogram was normal [Patient reported colonoscopy was normal] : Patient reported colonoscopy was normal [] : No [[id number]] : 0 [MammogramDate] : 01/2017 [ColonoscopyDate] : 02/2018

## 2019-03-04 NOTE — HISTORY OF PRESENT ILLNESS
[FreeTextEntry1] : dizziness, pulmonary evaluation. [de-identified] : This is a pleasant 68-year-old female with a history of hypertension, rheumatoid arthritis, hyperlipidemia. She currently is being evaluated by cardiology for recurrent dizziness. She has noted this especially during the last 3 weeks and it occurs when going from lying down in bed and going quickly to a standing position. She is to have echocardiogram and followup with Dr. Bonilla. Her recent labs showed a hemoglobin of 10.9. Her lipids were elevated, but she did say she had eaten before the testing. Vitamin D level was 18.5. She will have a repeat CBC ordered today. She will also followup with her PCP Dr. Marilee Villanueva, regarding further evaluation and treatment of her anemia and low vitamin D level.\par \par She is not having any coughing, sputum production, wheezing, or hemoptysis.  She is not having shortness of breath or dyspnea on exertion. She is a nonsmoker.\par \par She is on methotrexate and leflunomide for her rheumatoid arthritis. She denies previous chronic lung disease.

## 2019-03-04 NOTE — HISTORY OF PRESENT ILLNESS
[FreeTextEntry1] : dizziness, pulmonary evaluation. [de-identified] : This is a pleasant 68-year-old female with a history of hypertension, rheumatoid arthritis, hyperlipidemia. She currently is being evaluated by cardiology for recurrent dizziness. She has noted this especially during the last 3 weeks and it occurs when going from lying down in bed and going quickly to a standing position. She is to have echocardiogram and followup with Dr. Bonilla. Her recent labs showed a hemoglobin of 10.9. Her lipids were elevated, but she did say she had eaten before the testing. Vitamin D level was 18.5. She will have a repeat CBC ordered today. She will also followup with her PCP Dr. Marilee Villanueva, regarding further evaluation and treatment of her anemia and low vitamin D level.\par \par She is not having any coughing, sputum production, wheezing, or hemoptysis.  She is not having shortness of breath or dyspnea on exertion. She is a nonsmoker.\par \par She is on methotrexate and leflunomide for her rheumatoid arthritis. She denies previous chronic lung disease.

## 2019-03-04 NOTE — REVIEW OF SYSTEMS
[Chest Pain] : no chest pain [Shortness Of Breath] : no shortness of breath [Wheezing] : no wheezing [Cough] : no cough [Dyspnea on Exertion] : no dyspnea on exertion [Fainting] : no fainting [FreeTextEntry5] : noted palpitations x 2 yesterday

## 2019-03-04 NOTE — DATA REVIEWED
[FreeTextEntry1] : Full pulmonary function testing today shows a mild restrictive deficit with an FVC of 2.00 or 67% predicted. Diffusing capacity is mildly reduced.\par \par Repeat P and BP: sitting 84, 118/60.   standing  72, 108/60  ( no reported dizziness).

## 2019-03-04 NOTE — HEALTH RISK ASSESSMENT
[0] : 2) Feeling down, depressed, or hopeless: Not at all (0) [Patient reported mammogram was normal] : Patient reported mammogram was normal [Patient reported colonoscopy was normal] : Patient reported colonoscopy was normal [] : No [[id number]] : 0 [MammogramDate] : 01/2017 [ColonoscopyDate] : 02/2018

## 2019-03-04 NOTE — ASSESSMENT
[FreeTextEntry1] : #1 dizziness. Patient was instructed to ensure she drinks more fluids. She will go from a lying to standing position slowly. Continue to follow with cardiology, to have echocardiogram as well.\par \par #2 anemia. Hemoglobin 10.9. Will repeat CBC today. Patient states that she will follow closely with her PCP, Dr. Villanueva, for further evaluation and treatment.\par \par #3 mild restriction on pulmonary function testing. Patient is not having any respiratory symptoms. May be due to overweight status. She will have PA and lateral chest x-ray. I've asked her to return for a following pulmonary appointment in 3 months. Call or return anytime should there be any increase in symptoms or clinical change.\par \par RV 3 months.\par \par report sent to Dr Magi Arnold and Dr Palla Venugopal.

## 2019-03-16 NOTE — PHYSICAL EXAM
[General Appearance - Well Developed] : well developed [Normal Appearance] : normal appearance [Well Groomed] : well groomed [General Appearance - Well Nourished] : well nourished [No Deformities] : no deformities [General Appearance - In No Acute Distress] : no acute distress [Normal Conjunctiva] : the conjunctiva exhibited no abnormalities [Eyelids - No Xanthelasma] : the eyelids demonstrated no xanthelasmas [Normal Oral Mucosa] : normal oral mucosa [No Oral Pallor] : no oral pallor [No Oral Cyanosis] : no oral cyanosis [Normal Jugular Venous A Waves Present] : normal jugular venous A waves present [Normal Jugular Venous V Waves Present] : normal jugular venous V waves present [No Jugular Venous Wade A Waves] : no jugular venous wade A waves [Respiration, Rhythm And Depth] : normal respiratory rhythm and effort [Exaggerated Use Of Accessory Muscles For Inspiration] : no accessory muscle use [Auscultation Breath Sounds / Voice Sounds] : lungs were clear to auscultation bilaterally [Heart Rate And Rhythm] : heart rate and rhythm were normal [Heart Sounds] : normal S1 and S2 [Murmurs] : no murmurs present [Abdomen Soft] : soft [Abdomen Tenderness] : non-tender [Abdomen Mass (___ Cm)] : no abdominal mass palpated [Abnormal Walk] : normal gait [Gait - Sufficient For Exercise Testing] : the gait was sufficient for exercise testing [Nail Clubbing] : no clubbing of the fingernails [Cyanosis, Localized] : no localized cyanosis [Petechial Hemorrhages (___cm)] : no petechial hemorrhages [Skin Color & Pigmentation] : normal skin color and pigmentation [] : no rash [No Venous Stasis] : no venous stasis [Skin Lesions] : no skin lesions [No Skin Ulcers] : no skin ulcer [No Xanthoma] : no  xanthoma was observed [Oriented To Time, Place, And Person] : oriented to person, place, and time [Affect] : the affect was normal [Mood] : the mood was normal [No Anxiety] : not feeling anxious

## 2019-03-16 NOTE — HISTORY OF PRESENT ILLNESS
[FreeTextEntry1] : [age]yo female presents for evaluation of dizziness at the suggestion of her PMD. Pt reports that her symptoms are precipitated  by standing from a seated position. Pt denies chest pain or exertional symptoms.She has no history of MI or CHF. She has a history of RA

## 2019-03-16 NOTE — DISCUSSION/SUMMARY
[Patient] : the patient [___ Month(s)] : [unfilled] month(s) [With ___] : with [unfilled] [FreeTextEntry4] : dizziness [FreeTextEntry1] : Echoa is ordered. Symptoms suggest orthostatic changes. F/U in 1 months.

## 2019-03-22 ENCOUNTER — APPOINTMENT (OUTPATIENT)
Dept: CARDIOLOGY | Facility: CLINIC | Age: 69
End: 2019-03-22
Payer: MEDICAID

## 2019-03-22 PROCEDURE — 93306 TTE W/DOPPLER COMPLETE: CPT

## 2019-03-28 ENCOUNTER — NON-APPOINTMENT (OUTPATIENT)
Age: 69
End: 2019-03-28

## 2019-03-28 ENCOUNTER — APPOINTMENT (OUTPATIENT)
Dept: CARDIOLOGY | Facility: CLINIC | Age: 69
End: 2019-03-28
Payer: MEDICAID

## 2019-03-28 VITALS — WEIGHT: 166 LBS | BODY MASS INDEX: 27.66 KG/M2 | HEIGHT: 65 IN

## 2019-03-28 VITALS — SYSTOLIC BLOOD PRESSURE: 150 MMHG | DIASTOLIC BLOOD PRESSURE: 64 MMHG

## 2019-03-28 VITALS — DIASTOLIC BLOOD PRESSURE: 78 MMHG | OXYGEN SATURATION: 100 % | SYSTOLIC BLOOD PRESSURE: 159 MMHG | HEART RATE: 75 BPM

## 2019-03-28 DIAGNOSIS — R42 DIZZINESS AND GIDDINESS: ICD-10-CM

## 2019-03-28 PROCEDURE — 99214 OFFICE O/P EST MOD 30 MIN: CPT | Mod: 25

## 2019-03-28 PROCEDURE — 93000 ELECTROCARDIOGRAM COMPLETE: CPT

## 2019-03-28 NOTE — ASSESSMENT
[FreeTextEntry1] : Patient with above hx with episode of dizziness  probably sudden change in position  (BPV) , with controlled Mild uncontrolled hypertension without immediate orthostasis . \par occasional skipped heart beat ? PAC\par Mild pure hypercholesteremia with high HDL ,\par Mild low diastolic blood pressure probably her age associated vascular atherosclerotic changes  chronic mild anemia ,\par \par Mild pulmonary hypertension  stable , mild MR \par \par Plan: advised the patient change position slowly . encourage liquid intake .low cholesterol diet , flax seed oil 2 grams daily , recheck cholesterol\par  continue her present medication, increase lisinopril to 20 mg po daily

## 2019-03-28 NOTE — REVIEW OF SYSTEMS
[Fever] : no fever [Blurry Vision] : no blurred vision [Earache] : no earache [Shortness Of Breath] : no shortness of breath [Palpitations] : no palpitations [Cough] : no cough [Abdominal Pain] : no abdominal pain [Heartburn] : heartburn [Dysphagia] : no dysphagia [Dysuria] : no dysuria [Joint Pain] : no joint pain [Dizziness] : no dizziness [Confusion] : no confusion was observed [Anxiety] : no anxiety

## 2019-03-28 NOTE — HISTORY OF PRESENT ILLNESS
[FreeTextEntry1] : 68 year old female with hx of rheumatoid arthritis , hypertension, HLD  who came with complain that she felt mild dizzy feeling when she got out of bed  a month ago , daily for for three days , was seen in the office last months , patient says she had when she got out of bed , one time when she goes down the stairs , no other associated symptoms , last for few minutes with rest resolves  . \par \par Patient denies any other associated symptoms like palpitations or chest pain or shortness of breath .\par \par Patient does drink enough liquids, patient does have occasional isolated skipped heart beat , patient denies exertional chest pain .\par \par patient does have elevated total cholesterol with very high HDL ,  does have chronic anemia ( mild ) .\par Patient does have occasional reflux symptoms on eating certain type of food . \par \par \par

## 2019-03-28 NOTE — REASON FOR VISIT
[Follow-Up - Clinic] : a clinic follow-up of [Dizziness] : dizziness [Hyperlipidemia] : hyperlipidemia [Hypertension] : hypertension [Medication Management] : Medication management [FreeTextEntry1] : Rheumatoid

## 2019-03-28 NOTE — PHYSICAL EXAM
[General Appearance - Well Developed] : well developed [Normal Conjunctiva] : the conjunctiva exhibited no abnormalities [No Oral Cyanosis] : no oral cyanosis [Normal Jugular Venous A Waves Present] : normal jugular venous A waves present [Respiration, Rhythm And Depth] : normal respiratory rhythm and effort [Exaggerated Use Of Accessory Muscles For Inspiration] : no accessory muscle use [Auscultation Breath Sounds / Voice Sounds] : lungs were clear to auscultation bilaterally [Chest Palpation] : palpation of the chest revealed no abnormalities [Lungs Percussion] : the lungs were normal to percussion [Heart Rate And Rhythm] : heart rate and rhythm were normal [Heart Sounds] : normal S1 and S2 [Murmurs] : no murmurs present [Arterial Pulses Normal] : the arterial pulses were normal [Edema] : no peripheral edema present [Veins - Varicosity Changes] : no varicosital changes were noted in the lower extremities [Abdomen Soft] : soft [Abdomen Tenderness] : non-tender [Abdomen Mass (___ Cm)] : no abdominal mass palpated [Abnormal Walk] : normal gait [Nail Clubbing] : no clubbing of the fingernails [] : no ischemic changes [Skin Color & Pigmentation] : normal skin color and pigmentation [Skin Turgor] : normal skin turgor [Oriented To Time, Place, And Person] : oriented to person, place, and time

## 2019-05-28 ENCOUNTER — RX RENEWAL (OUTPATIENT)
Age: 69
End: 2019-05-28

## 2019-05-28 NOTE — H&P ADULT - NSHPLABSRESULTS_GEN_ALL_CORE
Patient will come in for office visit on 06/6/2019   8.2    15.6  )-----------( 281      ( 2018 22:51 )             26.6         142  |  113<H>  |  13  ----------------------------<  105<H>  3.9   |  20<L>  |  0.83    Ca    8.0<L>      2018 22:51  Mg     1.7         TPro  6.2  /  Alb  2.7<L>  /  TBili  0.3  /  DBili  x   /  AST  33  /  ALT  27  /  AlkPhos  92      PT/INR - ( 2018 22:51 )   PT: 14.4 sec;   INR: 1.31 ratio         PTT - ( 2018 22:51 )  PTT:29.2 sec  Urinalysis Basic - ( 2018 22:58 )    Color: Yellow / Appearance: Slightly Turbid / S.005 / pH: x  Gluc: x / Ketone: Negative  / Bili: Negative / Urobili: Negative   Blood: x / Protein: Negative / Nitrite: Negative   Leuk Esterase: Small / RBC: 3-5 /HPF / WBC 11-25   Sq Epi: x / Non Sq Epi: Few / Bacteria: Occasional      Lactate, Blood: 2.1 mmol/L ( @ 22:58)  Lactate, Blood: 1.1 mmol/L ( @ 06:16)  Lactate, Blood: 2.9 mmol/L ( @ 03:14)    Culture - Blood (18 @ 08:27)    Gram Stain:   Growth in aerobic and anaerobic bottles: Gram Negative Rods    Specimen Source: .Blood Blood    Culture Results:   Growth in aerobic and anaerobic bottles: Gram Negative Rods    < from: CT Renal Stone Hunt (18 @ 22:28) >    IMPRESSION:    No obstructing urinary tract calculus, nephrolithiasis, or   hydroureteronephrosis. Mild asymmetric left-sided periureteral fat   stranding may be reflective of an ascending urinary tract infection. Mild   thickening of the urinary bladder walls with slight infiltration of the   perivesicular fat likely reflective of a cystitis.    < end of copied text >

## 2019-07-18 ENCOUNTER — INPATIENT (INPATIENT)
Facility: HOSPITAL | Age: 69
LOS: 1 days | Discharge: HOME | DRG: 378 | End: 2019-07-20
Attending: STUDENT IN AN ORGANIZED HEALTH CARE EDUCATION/TRAINING PROGRAM | Admitting: STUDENT IN AN ORGANIZED HEALTH CARE EDUCATION/TRAINING PROGRAM
Payer: MEDICAID

## 2019-07-18 ENCOUNTER — TRANSCRIPTION ENCOUNTER (OUTPATIENT)
Age: 69
End: 2019-07-18

## 2019-07-18 VITALS
TEMPERATURE: 98 F | WEIGHT: 173.94 LBS | SYSTOLIC BLOOD PRESSURE: 148 MMHG | RESPIRATION RATE: 16 BRPM | OXYGEN SATURATION: 100 % | DIASTOLIC BLOOD PRESSURE: 90 MMHG | HEIGHT: 65 IN | HEART RATE: 71 BPM

## 2019-07-18 DIAGNOSIS — Z98.890 OTHER SPECIFIED POSTPROCEDURAL STATES: Chronic | ICD-10-CM

## 2019-07-18 LAB
ALBUMIN SERPL ELPH-MCNC: 3.8 G/DL — SIGNIFICANT CHANGE UP (ref 3.3–5)
ALP SERPL-CCNC: 104 U/L — SIGNIFICANT CHANGE UP (ref 40–120)
ALT FLD-CCNC: 28 U/L — SIGNIFICANT CHANGE UP (ref 12–78)
ANION GAP SERPL CALC-SCNC: 7 MMOL/L — SIGNIFICANT CHANGE UP (ref 5–17)
APPEARANCE UR: CLEAR — SIGNIFICANT CHANGE UP
APTT BLD: 33.3 SEC — SIGNIFICANT CHANGE UP (ref 28.5–37)
AST SERPL-CCNC: 31 U/L — SIGNIFICANT CHANGE UP (ref 15–37)
BACTERIA # UR AUTO: ABNORMAL
BASOPHILS # BLD AUTO: 0.08 K/UL — SIGNIFICANT CHANGE UP (ref 0–0.2)
BASOPHILS NFR BLD AUTO: 0.8 % — SIGNIFICANT CHANGE UP (ref 0–2)
BILIRUB SERPL-MCNC: 0.6 MG/DL — SIGNIFICANT CHANGE UP (ref 0.2–1.2)
BILIRUB UR-MCNC: NEGATIVE — SIGNIFICANT CHANGE UP
BUN SERPL-MCNC: 18 MG/DL — SIGNIFICANT CHANGE UP (ref 7–23)
CALCIUM SERPL-MCNC: 9.1 MG/DL — SIGNIFICANT CHANGE UP (ref 8.5–10.1)
CHLORIDE SERPL-SCNC: 111 MMOL/L — HIGH (ref 96–108)
CO2 SERPL-SCNC: 23 MMOL/L — SIGNIFICANT CHANGE UP (ref 22–31)
COLOR SPEC: YELLOW — SIGNIFICANT CHANGE UP
CREAT SERPL-MCNC: 0.82 MG/DL — SIGNIFICANT CHANGE UP (ref 0.5–1.3)
DIFF PNL FLD: ABNORMAL
EOSINOPHIL # BLD AUTO: 0.2 K/UL — SIGNIFICANT CHANGE UP (ref 0–0.5)
EOSINOPHIL NFR BLD AUTO: 2 % — SIGNIFICANT CHANGE UP (ref 0–6)
EPI CELLS # UR: SIGNIFICANT CHANGE UP
GLUCOSE SERPL-MCNC: 107 MG/DL — HIGH (ref 70–99)
GLUCOSE UR QL: NEGATIVE — SIGNIFICANT CHANGE UP
HCT VFR BLD CALC: 35 % — SIGNIFICANT CHANGE UP (ref 34.5–45)
HGB BLD-MCNC: 11.2 G/DL — LOW (ref 11.5–15.5)
IMM GRANULOCYTES NFR BLD AUTO: 0.3 % — SIGNIFICANT CHANGE UP (ref 0–1.5)
INR BLD: 1 RATIO — SIGNIFICANT CHANGE UP (ref 0.88–1.16)
KETONES UR-MCNC: NEGATIVE — SIGNIFICANT CHANGE UP
LEUKOCYTE ESTERASE UR-ACNC: ABNORMAL
LIDOCAIN IGE QN: 140 U/L — SIGNIFICANT CHANGE UP (ref 73–393)
LYMPHOCYTES # BLD AUTO: 1.02 K/UL — SIGNIFICANT CHANGE UP (ref 1–3.3)
LYMPHOCYTES # BLD AUTO: 10.3 % — LOW (ref 13–44)
MCHC RBC-ENTMCNC: 32 GM/DL — SIGNIFICANT CHANGE UP (ref 32–36)
MCHC RBC-ENTMCNC: 32.9 PG — SIGNIFICANT CHANGE UP (ref 27–34)
MCV RBC AUTO: 102.9 FL — HIGH (ref 80–100)
MONOCYTES # BLD AUTO: 0.59 K/UL — SIGNIFICANT CHANGE UP (ref 0–0.9)
MONOCYTES NFR BLD AUTO: 6 % — SIGNIFICANT CHANGE UP (ref 2–14)
NEUTROPHILS # BLD AUTO: 7.99 K/UL — HIGH (ref 1.8–7.4)
NEUTROPHILS NFR BLD AUTO: 80.6 % — HIGH (ref 43–77)
NITRITE UR-MCNC: NEGATIVE — SIGNIFICANT CHANGE UP
NRBC # BLD: 0 /100 WBCS — SIGNIFICANT CHANGE UP (ref 0–0)
OB PNL STL: POSITIVE
PH UR: 6 — SIGNIFICANT CHANGE UP (ref 5–8)
PLATELET # BLD AUTO: 296 K/UL — SIGNIFICANT CHANGE UP (ref 150–400)
POTASSIUM SERPL-MCNC: 4.6 MMOL/L — SIGNIFICANT CHANGE UP (ref 3.5–5.3)
POTASSIUM SERPL-SCNC: 4.6 MMOL/L — SIGNIFICANT CHANGE UP (ref 3.5–5.3)
PROT SERPL-MCNC: 7.4 G/DL — SIGNIFICANT CHANGE UP (ref 6–8.3)
PROT UR-MCNC: NEGATIVE — SIGNIFICANT CHANGE UP
PROTHROM AB SERPL-ACNC: 11.4 SEC — SIGNIFICANT CHANGE UP (ref 10–12.9)
RBC # BLD: 3.4 M/UL — LOW (ref 3.8–5.2)
RBC # FLD: 14.6 % — HIGH (ref 10.3–14.5)
RBC CASTS # UR COMP ASSIST: SIGNIFICANT CHANGE UP /HPF (ref 0–4)
SODIUM SERPL-SCNC: 141 MMOL/L — SIGNIFICANT CHANGE UP (ref 135–145)
SP GR SPEC: 1.01 — SIGNIFICANT CHANGE UP (ref 1.01–1.02)
UROBILINOGEN FLD QL: NEGATIVE — SIGNIFICANT CHANGE UP
WBC # BLD: 9.91 K/UL — SIGNIFICANT CHANGE UP (ref 3.8–10.5)
WBC # FLD AUTO: 9.91 K/UL — SIGNIFICANT CHANGE UP (ref 3.8–10.5)
WBC UR QL: SIGNIFICANT CHANGE UP

## 2019-07-18 PROCEDURE — 93010 ELECTROCARDIOGRAM REPORT: CPT

## 2019-07-18 RX ORDER — KETOROLAC TROMETHAMINE 30 MG/ML
15 SYRINGE (ML) INJECTION ONCE
Refills: 0 | Status: DISCONTINUED | OUTPATIENT
Start: 2019-07-18 | End: 2019-07-18

## 2019-07-18 RX ORDER — SODIUM CHLORIDE 9 MG/ML
3 INJECTION INTRAMUSCULAR; INTRAVENOUS; SUBCUTANEOUS ONCE
Refills: 0 | Status: COMPLETED | OUTPATIENT
Start: 2019-07-18 | End: 2019-07-18

## 2019-07-18 RX ORDER — SODIUM CHLORIDE 9 MG/ML
1000 INJECTION INTRAMUSCULAR; INTRAVENOUS; SUBCUTANEOUS ONCE
Refills: 0 | Status: COMPLETED | OUTPATIENT
Start: 2019-07-18 | End: 2019-07-18

## 2019-07-18 RX ORDER — IOHEXOL 300 MG/ML
30 INJECTION, SOLUTION INTRAVENOUS ONCE
Refills: 0 | Status: COMPLETED | OUTPATIENT
Start: 2019-07-18 | End: 2019-07-18

## 2019-07-18 RX ORDER — LISINOPRIL 2.5 MG/1
1 TABLET ORAL
Qty: 0 | Refills: 0 | DISCHARGE

## 2019-07-18 RX ORDER — PREDNISOLONE 5 MG
1 TABLET ORAL
Qty: 0 | Refills: 0 | DISCHARGE

## 2019-07-18 RX ADMIN — IOHEXOL 30 MILLILITER(S): 300 INJECTION, SOLUTION INTRAVENOUS at 21:05

## 2019-07-18 RX ADMIN — Medication 15 MILLIGRAM(S): at 21:37

## 2019-07-18 RX ADMIN — Medication 15 MILLIGRAM(S): at 21:07

## 2019-07-18 RX ADMIN — SODIUM CHLORIDE 1000 MILLILITER(S): 9 INJECTION INTRAMUSCULAR; INTRAVENOUS; SUBCUTANEOUS at 21:06

## 2019-07-18 RX ADMIN — SODIUM CHLORIDE 3 MILLILITER(S): 9 INJECTION INTRAMUSCULAR; INTRAVENOUS; SUBCUTANEOUS at 21:08

## 2019-07-18 RX ADMIN — SODIUM CHLORIDE 1000 MILLILITER(S): 9 INJECTION INTRAMUSCULAR; INTRAVENOUS; SUBCUTANEOUS at 21:59

## 2019-07-18 NOTE — ED ADULT NURSE NOTE - CHIEF COMPLAINT QUOTE
blood in stool x3/ abdominal pain  hx of anemia and peptic ulcers; denies hx of blood transfusions or blood thinners  colonoscopy x4 months here in hospital

## 2019-07-18 NOTE — ED ADULT TRIAGE NOTE - CHIEF COMPLAINT QUOTE
blood in stool x3/ abdominal pain  hx of peptic ulcers  colonoscopy x4 months here in hospital blood in stool x3/ abdominal pain  hx of anemia and peptic ulcers; denies hx of blood transfusions or blood thinners  colonoscopy x4 months here in hospital

## 2019-07-18 NOTE — ED PROVIDER NOTE - NONTENDER LOCATION
left costovertebral angle/right lower quadrant/right costovertebral angle/left upper quadrant/umbilical/right upper quadrant/periumbilical/suprapubic

## 2019-07-18 NOTE — ED PROVIDER NOTE - CARDIAC, MLM
GI    suprapubic pain improved after straight cath yielded 600 cc urine  upper abd pain resolved  no n/v  eating breakfast currently    ROS: otherwise negative, all reviewed  Vital Signs Last 24 Hrs  T(C): 37 (23 May 2019 04:54), Max: 38 (22 May 2019 21:59)  T(F): 98.6 (23 May 2019 04:54), Max: 100.4 (22 May 2019 21:59)  HR: 66 (23 May 2019 06:06) (66 - 88)  BP: 189/53 (23 May 2019 06:06) (152/66 - 200/62)  BP(mean): --  RR: 16 (23 May 2019 06:06) (16 - 19)  SpO2: 100% (23 May 2019 06:06) (99% - 100%)    PE:    Constitutional: frail looking  HEENT: NC/AT, EOMI, PERRLA, conjunctivae clear   Neck: supple   Respiratory: decreased breath sounds, rales   Cardiovascular: S1S2 regular, no murmurs  Abdomen: soft, no tenderness, not distended  Lymphatic: no LN palpable  Musculoskeletal: no muscle tenderness, no joint swelling or tenderness  Extremities: no pedal edema  Neurological/ Psychiatric:  moving all extremities; agitated  Skin: no rashes; no palpable lesions                                                                  8.9    9.13  )-----------( 279      ( 23 May 2019 07:32 )             26.2 Normal rate, regular rhythm.  Heart sounds S1, S2.  No murmurs, rubs or gallops.

## 2019-07-18 NOTE — ED PROVIDER NOTE - ATTENDING CONTRIBUTION TO CARE
Pt is a 68 yo female who presents to the ED with bloody stool.  PMHx of Anemia, HTN, RA.  Pt reports that she noted bright red blood in her stool today with lower abd pain.  H/p peptic ulcer in the past.  Was on PPI x 3 months not currently taking.  Denies fevers but reports chills,  nausea.  Had 4 loose BM today.  Denies V/C, CP, SOB.  Only ate breakfast today.  On exam lying in bed NAD, NCAT, PERRL, heart RRR, lungs CTA, abd soft with TTP diffusely to lower abd region +BS.  Will obtain screening labs, check INR, type and screen, and CT abd/pelvis.  Agree with above plan of care

## 2019-07-18 NOTE — ED ADULT NURSE NOTE - OBJECTIVE STATEMENT
Received patient in bed 5A. Witnessed ambulating steady with family. c/o blood in stool x3/ abdominal pain. Some nausea, denies any vomiting.   hx of anemia and peptic ulcers; denies hx of blood transfusions or blood thinners. had colonoscopy x4 months here in hospital Received patient in bed 5A. Witnessed ambulating steady with family. c/o blood in stool x4 today- soft formed/ abdominal pain. Some nausea, denies any vomiting. denies any dizziness or other complaints.  hx of anemia and peptic ulcers; denies hx of blood transfusions or blood thinners. had colonoscopy x4 months here in hospital. Monitor/EKG/labs/completed. Awaiting MD evans

## 2019-07-18 NOTE — ED PROVIDER NOTE - CLINICAL SUMMARY MEDICAL DECISION MAKING FREE TEXT BOX
labs including H&H to eval for anemia, occult to r/o GIB, ct abd/pelvis to r/o diverticulitis, ekg, ivf, re-eval

## 2019-07-18 NOTE — ED PROVIDER NOTE - OBJECTIVE STATEMENT
pt is a 68yo female with pmhx of RA, HTN, peptic ulcers c/o abd pain x today. pt reports diffuse abd cramping with 3 episodes of dark bloody bm. pt reports previous hx of peptic ulcer. pt did not take anything for symptoms. pt denies fever, n/v/d, cp, sob.

## 2019-07-19 ENCOUNTER — RESULT REVIEW (OUTPATIENT)
Age: 69
End: 2019-07-19

## 2019-07-19 DIAGNOSIS — H25.9 UNSPECIFIED AGE-RELATED CATARACT: Chronic | ICD-10-CM

## 2019-07-19 DIAGNOSIS — K92.2 GASTROINTESTINAL HEMORRHAGE, UNSPECIFIED: ICD-10-CM

## 2019-07-19 DIAGNOSIS — K27.9 PEPTIC ULCER, SITE UNSPECIFIED, UNSPECIFIED AS ACUTE OR CHRONIC, WITHOUT HEMORRHAGE OR PERFORATION: ICD-10-CM

## 2019-07-19 DIAGNOSIS — I10 ESSENTIAL (PRIMARY) HYPERTENSION: ICD-10-CM

## 2019-07-19 DIAGNOSIS — R10.9 UNSPECIFIED ABDOMINAL PAIN: ICD-10-CM

## 2019-07-19 DIAGNOSIS — Z29.9 ENCOUNTER FOR PROPHYLACTIC MEASURES, UNSPECIFIED: ICD-10-CM

## 2019-07-19 DIAGNOSIS — M06.9 RHEUMATOID ARTHRITIS, UNSPECIFIED: ICD-10-CM

## 2019-07-19 DIAGNOSIS — D64.9 ANEMIA, UNSPECIFIED: ICD-10-CM

## 2019-07-19 LAB
ALBUMIN SERPL ELPH-MCNC: 3.2 G/DL — LOW (ref 3.3–5)
ALP SERPL-CCNC: 90 U/L — SIGNIFICANT CHANGE UP (ref 40–120)
ALT FLD-CCNC: 25 U/L — SIGNIFICANT CHANGE UP (ref 12–78)
ANION GAP SERPL CALC-SCNC: 7 MMOL/L — SIGNIFICANT CHANGE UP (ref 5–17)
AST SERPL-CCNC: 19 U/L — SIGNIFICANT CHANGE UP (ref 15–37)
BASOPHILS # BLD AUTO: 0.03 K/UL — SIGNIFICANT CHANGE UP (ref 0–0.2)
BASOPHILS # BLD AUTO: 0.06 K/UL — SIGNIFICANT CHANGE UP (ref 0–0.2)
BASOPHILS NFR BLD AUTO: 0.4 % — SIGNIFICANT CHANGE UP (ref 0–2)
BASOPHILS NFR BLD AUTO: 1.1 % — SIGNIFICANT CHANGE UP (ref 0–2)
BILIRUB SERPL-MCNC: 0.7 MG/DL — SIGNIFICANT CHANGE UP (ref 0.2–1.2)
BUN SERPL-MCNC: 13 MG/DL — SIGNIFICANT CHANGE UP (ref 7–23)
CALCIUM SERPL-MCNC: 8.4 MG/DL — LOW (ref 8.5–10.1)
CHLORIDE SERPL-SCNC: 111 MMOL/L — HIGH (ref 96–108)
CO2 SERPL-SCNC: 26 MMOL/L — SIGNIFICANT CHANGE UP (ref 22–31)
CREAT SERPL-MCNC: 0.73 MG/DL — SIGNIFICANT CHANGE UP (ref 0.5–1.3)
EOSINOPHIL # BLD AUTO: 0.18 K/UL — SIGNIFICANT CHANGE UP (ref 0–0.5)
EOSINOPHIL # BLD AUTO: 0.25 K/UL — SIGNIFICANT CHANGE UP (ref 0–0.5)
EOSINOPHIL NFR BLD AUTO: 2.5 % — SIGNIFICANT CHANGE UP (ref 0–6)
EOSINOPHIL NFR BLD AUTO: 4.7 % — SIGNIFICANT CHANGE UP (ref 0–6)
FERRITIN SERPL-MCNC: 88 NG/ML — SIGNIFICANT CHANGE UP (ref 15–150)
FOLATE SERPL-MCNC: 15 NG/ML — SIGNIFICANT CHANGE UP
GLUCOSE SERPL-MCNC: 87 MG/DL — SIGNIFICANT CHANGE UP (ref 70–99)
HCT VFR BLD CALC: 28.6 % — LOW (ref 34.5–45)
HCT VFR BLD CALC: 30.5 % — LOW (ref 34.5–45)
HCT VFR BLD CALC: 32.2 % — LOW (ref 34.5–45)
HGB BLD-MCNC: 10.2 G/DL — LOW (ref 11.5–15.5)
HGB BLD-MCNC: 9.3 G/DL — LOW (ref 11.5–15.5)
HGB BLD-MCNC: 9.8 G/DL — LOW (ref 11.5–15.5)
IMM GRANULOCYTES NFR BLD AUTO: 0.2 % — SIGNIFICANT CHANGE UP (ref 0–1.5)
IMM GRANULOCYTES NFR BLD AUTO: 0.3 % — SIGNIFICANT CHANGE UP (ref 0–1.5)
IRON SATN MFR SERPL: 111 UG/DL — SIGNIFICANT CHANGE UP (ref 30–160)
IRON SATN MFR SERPL: 37 % — SIGNIFICANT CHANGE UP (ref 14–50)
LYMPHOCYTES # BLD AUTO: 0.98 K/UL — LOW (ref 1–3.3)
LYMPHOCYTES # BLD AUTO: 1.07 K/UL — SIGNIFICANT CHANGE UP (ref 1–3.3)
LYMPHOCYTES # BLD AUTO: 13.9 % — SIGNIFICANT CHANGE UP (ref 13–44)
LYMPHOCYTES # BLD AUTO: 20 % — SIGNIFICANT CHANGE UP (ref 13–44)
MCHC RBC-ENTMCNC: 31.7 GM/DL — LOW (ref 32–36)
MCHC RBC-ENTMCNC: 32.5 GM/DL — SIGNIFICANT CHANGE UP (ref 32–36)
MCHC RBC-ENTMCNC: 32.9 PG — SIGNIFICANT CHANGE UP (ref 27–34)
MCHC RBC-ENTMCNC: 33.2 PG — SIGNIFICANT CHANGE UP (ref 27–34)
MCV RBC AUTO: 102.1 FL — HIGH (ref 80–100)
MCV RBC AUTO: 103.9 FL — HIGH (ref 80–100)
MONOCYTES # BLD AUTO: 0.42 K/UL — SIGNIFICANT CHANGE UP (ref 0–0.9)
MONOCYTES # BLD AUTO: 0.47 K/UL — SIGNIFICANT CHANGE UP (ref 0–0.9)
MONOCYTES NFR BLD AUTO: 5.9 % — SIGNIFICANT CHANGE UP (ref 2–14)
MONOCYTES NFR BLD AUTO: 8.8 % — SIGNIFICANT CHANGE UP (ref 2–14)
NEUTROPHILS # BLD AUTO: 3.48 K/UL — SIGNIFICANT CHANGE UP (ref 1.8–7.4)
NEUTROPHILS # BLD AUTO: 5.43 K/UL — SIGNIFICANT CHANGE UP (ref 1.8–7.4)
NEUTROPHILS NFR BLD AUTO: 65.2 % — SIGNIFICANT CHANGE UP (ref 43–77)
NEUTROPHILS NFR BLD AUTO: 77 % — SIGNIFICANT CHANGE UP (ref 43–77)
NRBC # BLD: 0 /100 WBCS — SIGNIFICANT CHANGE UP (ref 0–0)
NRBC # BLD: 0 /100 WBCS — SIGNIFICANT CHANGE UP (ref 0–0)
PLATELET # BLD AUTO: 225 K/UL — SIGNIFICANT CHANGE UP (ref 150–400)
PLATELET # BLD AUTO: 248 K/UL — SIGNIFICANT CHANGE UP (ref 150–400)
POTASSIUM SERPL-MCNC: 4 MMOL/L — SIGNIFICANT CHANGE UP (ref 3.5–5.3)
POTASSIUM SERPL-SCNC: 4 MMOL/L — SIGNIFICANT CHANGE UP (ref 3.5–5.3)
PROT SERPL-MCNC: 6.5 G/DL — SIGNIFICANT CHANGE UP (ref 6–8.3)
RBC # BLD: 2.8 M/UL — LOW (ref 3.8–5.2)
RBC # BLD: 3.03 M/UL — LOW (ref 3.8–5.2)
RBC # BLD: 3.1 M/UL — LOW (ref 3.8–5.2)
RBC # FLD: 14.4 % — SIGNIFICANT CHANGE UP (ref 10.3–14.5)
RBC # FLD: 14.5 % — SIGNIFICANT CHANGE UP (ref 10.3–14.5)
RETICS #: 38.5 K/UL — SIGNIFICANT CHANGE UP (ref 25–125)
RETICS/RBC NFR: 1.3 % — SIGNIFICANT CHANGE UP (ref 0.5–2.5)
SODIUM SERPL-SCNC: 144 MMOL/L — SIGNIFICANT CHANGE UP (ref 135–145)
TIBC SERPL-MCNC: 304 UG/DL — SIGNIFICANT CHANGE UP (ref 220–430)
UIBC SERPL-MCNC: 193 UG/DL — SIGNIFICANT CHANGE UP (ref 110–370)
VIT B12 SERPL-MCNC: 1536 PG/ML — HIGH (ref 232–1245)
WBC # BLD: 5.34 K/UL — SIGNIFICANT CHANGE UP (ref 3.8–10.5)
WBC # BLD: 7.06 K/UL — SIGNIFICANT CHANGE UP (ref 3.8–10.5)
WBC # FLD AUTO: 5.34 K/UL — SIGNIFICANT CHANGE UP (ref 3.8–10.5)
WBC # FLD AUTO: 7.06 K/UL — SIGNIFICANT CHANGE UP (ref 3.8–10.5)

## 2019-07-19 PROCEDURE — 99222 1ST HOSP IP/OBS MODERATE 55: CPT | Mod: GC,AI

## 2019-07-19 PROCEDURE — 12345: CPT | Mod: NC

## 2019-07-19 PROCEDURE — 99285 EMERGENCY DEPT VISIT HI MDM: CPT

## 2019-07-19 PROCEDURE — 88312 SPECIAL STAINS GROUP 1: CPT | Mod: 26

## 2019-07-19 PROCEDURE — 88305 TISSUE EXAM BY PATHOLOGIST: CPT | Mod: 26

## 2019-07-19 PROCEDURE — 74177 CT ABD & PELVIS W/CONTRAST: CPT | Mod: 26

## 2019-07-19 RX ORDER — SODIUM CHLORIDE 9 MG/ML
1000 INJECTION INTRAMUSCULAR; INTRAVENOUS; SUBCUTANEOUS
Refills: 0 | Status: DISCONTINUED | OUTPATIENT
Start: 2019-07-19 | End: 2019-07-19

## 2019-07-19 RX ORDER — FERROUS SULFATE 325(65) MG
325 TABLET ORAL DAILY
Refills: 0 | Status: DISCONTINUED | OUTPATIENT
Start: 2019-07-19 | End: 2019-07-20

## 2019-07-19 RX ORDER — FOLIC ACID 0.8 MG
1 TABLET ORAL DAILY
Refills: 0 | Status: DISCONTINUED | OUTPATIENT
Start: 2019-07-19 | End: 2019-07-20

## 2019-07-19 RX ORDER — METOPROLOL TARTRATE 50 MG
50 TABLET ORAL DAILY
Refills: 0 | Status: DISCONTINUED | OUTPATIENT
Start: 2019-07-19 | End: 2019-07-20

## 2019-07-19 RX ORDER — LISINOPRIL 2.5 MG/1
20 TABLET ORAL DAILY
Refills: 0 | Status: DISCONTINUED | OUTPATIENT
Start: 2019-07-19 | End: 2019-07-20

## 2019-07-19 RX ORDER — PANTOPRAZOLE SODIUM 20 MG/1
40 TABLET, DELAYED RELEASE ORAL DAILY
Refills: 0 | Status: DISCONTINUED | OUTPATIENT
Start: 2019-07-19 | End: 2019-07-20

## 2019-07-19 RX ORDER — PANTOPRAZOLE SODIUM 20 MG/1
1 TABLET, DELAYED RELEASE ORAL
Qty: 30 | Refills: 0
Start: 2019-07-19 | End: 2019-08-17

## 2019-07-19 RX ADMIN — SODIUM CHLORIDE 75 MILLILITER(S): 9 INJECTION INTRAMUSCULAR; INTRAVENOUS; SUBCUTANEOUS at 06:58

## 2019-07-19 RX ADMIN — Medication 325 MILLIGRAM(S): at 11:38

## 2019-07-19 RX ADMIN — Medication 1 MILLIGRAM(S): at 11:38

## 2019-07-19 RX ADMIN — LISINOPRIL 20 MILLIGRAM(S): 2.5 TABLET ORAL at 11:38

## 2019-07-19 RX ADMIN — Medication 50 MILLIGRAM(S): at 11:38

## 2019-07-19 RX ADMIN — Medication 2.5 MILLIGRAM(S): at 11:39

## 2019-07-19 RX ADMIN — PANTOPRAZOLE SODIUM 40 MILLIGRAM(S): 20 TABLET, DELAYED RELEASE ORAL at 11:38

## 2019-07-19 NOTE — H&P ADULT - PROBLEM SELECTOR PLAN 1
Patient presents with lower abdominal pain and 3 episodes of melena.  Admit to [facility].  - She was admitted to [facility] on 2/7/18 for GI bleeding and was found to have peptic ulcers. Most recent upper GI endoscopy performed on 12/21/18 showed normal upper endoscopy and history of esophageal ulcer that had resolved.   - Hgb dropped from 11.2 (7/18, 20:35) to 9.3 (7/19, 1:43), which may have been due to dilution from 1 L bolus.  - Hct low at 28.6, elevated .1  - FOBT positive.  - CT abdomen and pelvis with oral and IV contrast: no bowel obstruction or evidence of acute inflammation. Patient presents with lower abdominal pain and 3 episodes of melena.  Admit to [facility].  - She was admitted to [facility] on 2/7/18 for GI bleeding and was found to have peptic ulcers. Most recent upper GI endoscopy performed on 12/21/18 showed normal upper endoscopy and history of esophageal ulcer that had resolved.   - CT abdomen and pelvis with oral and IV contrast: no bowel obstruction or evidence of acute inflammation.  - Hgb dropped from 11.2 (7/18, 20:35) to 9.3 (7/19, 1:43), which may have a component to dilution from 1 L bolus. repeat in 6 hours  - Hct low at 28.6, elevated .1  - check iron, tibc, ferritin, b12 , folate   - FOBT positive.  - c/w protonix 40 iv qd  - NPO  - GI consult Dr. Salvador Patient presents with lower abdominal pain and 3 episodes of melena.  Admit to [facility].  - She was admitted to [facility] on 2/7/18 for GI bleeding and was found to have peptic ulcers. Most recent upper GI endoscopy performed on 12/21/18 showed normal upper endoscopy and history of esophageal ulcer that had resolved.   - CT abdomen and pelvis with oral and IV contrast: no bowel obstruction or evidence of acute inflammation.  - Hgb dropped from 11.2 (7/18, 20:35) to 9.3 (7/19, 1:43), which may have a component to dilution from 1 L bolus. repeat H/H at 8:00 (with CBC+Diff and CMP) and 14:00.  - Hct low at 28.6, elevated .1  - Check iron, TIBC, ferritin, reticulocyte level, b12, and folate   - FOBT positive.  - Continue with protonix 40 IV qd  - NPO except meds  - Consult GI  - Provide normal saline IV fluids at 75 ccs/hour x 8 hours

## 2019-07-19 NOTE — H&P ADULT - NSHPREVIEWOFSYSTEMS_GEN_ALL_CORE
REVIEW OF SYSTEMS:    CONSTITUTIONAL: No weakness, fevers or chills.   EYES/ENT: No visual changes; No blurry or double vision.  NECK: No pain or stiffness  RESPIRATORY: No cough, wheezing, hemoptysis, or shortness of breath  CARDIOVASCULAR: No chest pain or palpitations  GASTROINTESTINAL: +RLQ AND LLQ ABDOMINAL PAIN (CRAMPING, ACHING); +BLOODY STOOLS. DECREASED APPETITE. No nausea, vomiting, diarrhea, or constipation.   GENITOURINARY: No dysuria, frequency or hematuria  NEUROLOGICAL: No numbness or weakness  SKIN: No itching or rashes. No peripheral edema

## 2019-07-19 NOTE — H&P ADULT - PROBLEM SELECTOR PLAN 4
- Blood pressure stable at 132/62.  - Continue home dose medications of lisinopril 20 mg PO daily and metoprolol succinate 50 mg PO daily.

## 2019-07-19 NOTE — H&P ADULT - NSHPPHYSICALEXAM_GEN_ALL_CORE
PHYSICAL EXAM:  GENERAL: No acute distress; patient is lying in bed comfortably  HEAD:  Atraumatic, Normocephalic  EYES: EOMI, PERRLA, conjunctiva and sclera clear  ENT: Moist mucous membranes  NECK: Supple, No JVD  CHEST/LUNG: Clear to auscultation bilaterally; No rales, rhonchi, wheezing, or rubs. Unlabored respirations  HEART: Regular rate and rhythm; No murmurs, rubs, or gallops  ABDOMEN: +TENDERNESS TO PALPATION OF RLQ AND LLQ. Bowel sounds present; Soft, nondistended   EXTREMITIES:  2+ Peripheral Pulses, brisk capillary refill.   NERVOUS SYSTEM:  Alert & Oriented X3, speech clear. No deficits.  SKIN: No rashes or lesions PHYSICAL EXAM:  GENERAL: No acute distress; patient is lying in bed comfortably  HEAD:  Atraumatic, Normocephalic  EYES: EOMI, PERRLA, conjunctiva and sclera clear  ENT: Moist mucous membranes  NECK: Supple, No JVD  CHEST/LUNG: Clear to auscultation bilaterally; No rales, rhonchi, wheezing, or rubs. Unlabored respirations  HEART: Regular rate and rhythm; No murmurs, rubs, or gallops  ABDOMEN: +TENDERNESS TO PALPATION OF RLQ > LLQ. Bowel sounds present; Soft, nondistended   EXTREMITIES:  2+ Peripheral Pulses, brisk capillary refill.   NERVOUS SYSTEM:  Alert & Oriented X3, speech clear. No deficits.  SKIN: No rashes or lesions

## 2019-07-19 NOTE — CONSULT NOTE ADULT - ASSESSMENT
69 year old female with a past medical history of peptic ulcers, rheumatoid arthritis, hypertension, and anemia who presented to the emergency department for 3 episodes of maroon colored stools yesterday

## 2019-07-19 NOTE — CONSULT NOTE ADULT - SUBJECTIVE AND OBJECTIVE BOX
Chief Complaint:  Patient is a 69y old  Female who presents with a chief complaint of GI bleed (2019 04:20)    Peptic ulcer  Anemia  Hypertension  Rheumatoid arthritis  Age related cataract  H/O knee surgery     HPI:  Ms. Bingham is a 69 year old female with a past medical history of peptic ulcers, rheumatoid arthritis, hypertension, and anemia who presented to the emergency department for 1 day of 3 episodes of dark bloody bowel movements and right lower quadrant and left lower quadrant abdominal pain. She reports the symptoms started the morning on Thursday, . She describes the pain as aching and cramping and rates the pain a 3/10. She opal to [facility] on Thursday and was told to come to the emergency department. She reports poor appetite and the last meal she had was cereal on Thursday morning. Her stool was mildly loose. She denies any back pain, diarrhea, radiation, fever, shortness of breath, or chest pain. Of note, she reports she went to a birthday party on  and ate spicy  food.    She was admitted to [facility] on 18 for increased urinary frequency and mild lower back pain, found to have pyelonephritis and gram negative rods growing in blood cultures. While in the hospital, the patient's Hgb dropped to 7.3 and had 2 episodes of melena with associated lightheadedness. She was discharged on 18 on Sucralfate 1 g PO BID and Protonix 40 mg PO daily for peptic ulcer and ciprofloxacin 500 mg PO BID for UTI. She had an Upper GI endoscopy performed on 18, which showed mild chronic antral gastritis, mild chronic gastric carditis. She then had a follow-up upper GI endoscopy on 18, which showed normal upper endoscopy and history of esophageal ulcer that had resolved. It was recommended for her to continue pantoprazole for another 3 months.    In the ED, T 98.3 F, heart rate 77 bpm, blood pressure 132/62, respiration rate 18, SpO2 100% on room air. Normal WBC of 9.91. Low Hgb 11.2 (, 20:35), which dropped to 9.3 (, 01:43). Hct low at 28.6 (L), elevated .1 (H). Normal PT, INR, aPTT. CMP unremarkable. UA had small amount of blood, trace leukocyte esterase, occasional bacteria, negative nitrite. CT abdomen and pelvis with oral and IV contrast: no bowel obstruction or evidence of acute inflammation. Positive FOBT. She received 15 mg Toradol IV, 1 L normal saline IV bolus. EKG: normal sinus rhythm with non-specific ST and T wave abnormality.    Of note, Ms. Bingham was seen by cardiologist, Dr. Venugopal Palla on 3/28/19 for dizziness. An Echo was performed on 3/22/19, which showed mild to moderate tricuspid regurgitation, mild mitral regurgitation, LVEF 59%. An EKG showed normal sinus rhythm, low QRS voltage. Also of note, the patient has a family vacation on  to [place] and would lie to be able to attend. (2019 04:20)    gi consulted for gib. chart reviewed. pt seen and examined earlier this am. pt states tuesday was at a party and ate some spicy food, wednesday did n ot feel well, and then yesterday had 3 episodes of maroon/brb colored stools  associated w abd pain and nausea. denies f/c/vomiting/hematemesis/d/c/overt melena. had egd 2018 showed esophageal ulcers and gastritis, path neg for acute. had repeat egd 2018 which showed resolution of uclers. states she had colon last yr which was normal. denies prior abd sx.  [facility]- mom w esophageal ca. social etoh use. not on anti plts or ac but takes prednisone for ra. upon eval reports improvement in symptoms, denies n/v/abd pain, states she passed yellow brown stool this am.      recent vs/labs/imaging reviewed- avss  ob + hgd trend noted  ct a/p as below      gentamicin (Chills)      ferrous    sulfate 325 milliGRAM(s) Oral daily  folic acid 1 milliGRAM(s) Oral daily  lisinopril 20 milliGRAM(s) Oral daily  metoprolol succinate ER 50 milliGRAM(s) Oral daily  pantoprazole  Injectable 40 milliGRAM(s) IV Push daily  predniSONE   Tablet 2.5 milliGRAM(s) Oral daily  sodium chloride 0.9%. 1000 milliLiter(s) IV Continuous <Continuous>  sodium chloride 0.9%. 1000 milliLiter(s) IV Continuous <Continuous>        FAMILY HISTORY:  Family history of myocardial infarction: Father  FH: throat cancer: Mother        Review of Systems:    General:  No wt loss, fevers, chills, night sweats, fatigue  Eyes:  Good vision, no reported pain  ENT:  No sore throat, pain, runny nose, dysphagia  CV:  No pain, palpitations, no lightheadedness  Resp:  No dyspnea, cough, tachypnea, wheezing  GI: see above  :  No pain, bleeding, incontinence, nocturia  Muscle:  No pain, weakness  Neuro:  No weakness, tingling, memory problems  Psych:  No fatigue, insomnia, mood problems, depression  Endocrine:  No polyuria, polydypsia, cold/heat intolerance  Heme:  No petechiae, ecchymosis, easy bruisability  Skin:  No rash, tattoos, scars, edema    Relevant Family History:   n/c    Relevant Social History: n/c      Physical Exam:    Vital Signs:  Vital Signs Last 24 Hrs  T(C): 36.6 (2019 09:30), Max: 36.8 (2019 19:24)  T(F): 97.8 (2019 09:30), Max: 98.3 (2019 19:24)  HR: 75 (2019 09:30) (62 - 77)  BP: 129/59 (2019 09:30) (120/73 - 148/90)  BP(mean): --  RR: 19 (2019 09:30) (16 - 19)  SpO2: 100% (2019 09:30) (97% - 100%)  Daily Height in cm: 165.1 (2019 19:24)    Daily     General:  nad  HEENT:  NC/AT  Chest:  dec bs  Cardiovascular:  Regular rhythm, S1, S2  Abdomen:  Soft, non-tender, non-distended, on ramya no external hemorrhoids seen brown stool in rectal vault  Extremities:  no  edema  Skin:  No rash  Neuro/Psych:  A&O x3    Laboratory:                            10.2   5.34  )-----------( 248      ( 2019 08:09 )             32.2     07-19    144  |  111<H>  |  13  ----------------------------<  87  4.0   |  26  |  0.73    Ca    8.4<L>      2019 08:09    TPro  6.5  /  Alb  3.2<L>  /  TBili  0.7  /  DBili  x   /  AST  19  /  ALT  25  /  AlkPhos  90  07-19    LIVER FUNCTIONS - ( 2019 08:09 )  Alb: 3.2 g/dL / Pro: 6.5 g/dL / ALK PHOS: 90 U/L / ALT: 25 U/L / AST: 19 U/L / GGT: x           PT/INR - ( 2019 20:35 )   PT: 11.4 sec;   INR: 1.00 ratio         PTT - ( 2019 20:35 )  PTT:33.3 sec  Urinalysis Basic - ( 2019 21:16 )    Color: Yellow / Appearance: Clear / S.010 / pH: x  Gluc: x / Ketone: Negative  / Bili: Negative / Urobili: Negative   Blood: x / Protein: Negative / Nitrite: Negative   Leuk Esterase: Trace / RBC: 0-2 /HPF / WBC 0-2   Sq Epi: x / Non Sq Epi: Few / Bacteria: Occasional      Amylase Serum--      Lipase [id number]       Ammonia--    Imaging:      < from: CT Abdomen and Pelvis w/ Oral Cont and w/ IV Cont (19 @ 00:22) >    EXAM:  CT ABDOMEN AND PELVIS OC IC                            PROCEDURE DATE:  2019          INTERPRETATION:  CLINICAL INFORMATION: Left lower quadrant abdominal pain.    COMPARISON: None.    PROCEDURE:   CT of the Abdomen and Pelvis was performed with intravenous contrast.   Intravenous contrast: 95 ml Omnipaque 350. 5 ml discarded.  Oral contrast: positive contrast was administered.  Sagittal and coronal reformats were performed.    FINDINGS:    LOWER CHEST: Partially imaged opacity within the left lower lobe may   represent focus of subsegmental atelectasis.    LIVER: Within normal limits.  BILE DUCTS: Normal caliber.  GALLBLADDER: Within normal limits.  SPLEEN: Within normal limits.  PANCREAS: Within normal limits.  ADRENALS: Within normal limits.  KIDNEYS/URETERS: Within normal limits.    BLADDER: Within normal limits.  REPRODUCTIVE ORGANS: Uterus and adnexa within normal limits.    BOWEL: No bowel obstruction. Appendix normal. Tiny hiatal hernia.  PERITONEUM: No ascites.  VESSELS:  Within normal limits.  RETROPERITONEUM: No lymphadenopathy.    ABDOMINAL WALL: Tiny fat-containing umbilical hernia.  BONES: Intraosseous T12 vertebral body hemangioma.    IMPRESSION:     No bowel obstruction or evidence of acute inflammation.                    TELMA CONWAY M.D., ATTENDING RADIOLOGIST  This document has been electronically signed. 2019  1:04AM                < end of copied text >

## 2019-07-19 NOTE — H&P ADULT - PROBLEM SELECTOR PLAN 3
- Patient has a history of GI bleeding secondary to peptic ulcer, diagnosed on 2/7/18.   - Upper endoscopy on 12/21/18 was normal and history of esophageal ulcer that had resolved.  - Continue to monitor H/H. - Patient has a history of GI bleeding secondary to peptic ulcer, diagnosed on 2/7/18.   - Upper endoscopy on 12/21/18 was normal and history of esophageal ulcer that had resolved.  - Continue to monitor H/H. Repeat at 8:00 and 14:00.

## 2019-07-19 NOTE — H&P ADULT - PROBLEM SELECTOR PLAN 5
- Continue home dose of prednisolone 2.5 mg PO daily and leflunomide 10 mg PO daily. - Continue home dose of prednisolone 2.5 mg PO daily and leflunomide 10 mg PO daily (must bring from home). last dose of methotrexate wednesday - Continue home dose of prednisolone 2.5 mg PO daily and leflunomide 10 mg PO daily (must bring from home). last dose of methotrexate Wednesday - Continue home dose of prednisolone 2.5 mg PO daily and leflunomide 10 mg PO daily (patient has leflunomide with her and must take from her home supply). last dose of methotrexate Wednesday.

## 2019-07-19 NOTE — H&P ADULT - PROBLEM SELECTOR PLAN 2
- 3 episodes of melena  - Low Hgb of 9.3 (7/19, 1:43) that dropped from 11.2 (7/18, 20:35).  - Patient has anemia at baseline and takes ferrous sulfate 325 mg PO daily and folic acid 1 mg PO daily. Continue home dose.  - Continue to monitor H/H. - 3 episodes of melena  - Low Hgb of 9.3 (7/19, 1:43) that dropped from 11.2 (7/18, 20:35).  - Patient has anemia at baseline and takes ferrous sulfate 325 mg PO daily and folic acid 1 mg PO daily. Continue home dose.  - Continue to monitor H/H. Repeat at 8:00 and 14:00.

## 2019-07-19 NOTE — CHART NOTE - NSCHARTNOTEFT_GEN_A_CORE
I saw and examined the patient, stable,  her abdominal pain improved, rectal bleed stopped. Hb dropped 2 g, from 11 =>> 9. continue IV PPI, still NPO, discussed Dr Alcantar , consult still pending, FOBT positive. f/u with GI team. I saw and examined the patient, stable,  her abdominal pain improved, rectal bleed stopped. Hb dropped 2 g, from 11 =>> 9. continue IV PPI, still NPO, discussed Dr Alcantar , consult still pending, FOBT positive. GI team decided to do EGD today. continue NPO and IVF.

## 2019-07-19 NOTE — ED ADULT NURSE REASSESSMENT NOTE - NS ED NURSE REASSESS COMMENT FT1
Pt resting comfortably at this time. No s/s of pain noted. rpt cbc sent, awaiting results. Patient provided with CT results by Dr. Keyes.

## 2019-07-19 NOTE — H&P ADULT - NSHPSOCIALHISTORY_GEN_ALL_CORE
Lives with two daughters.  Independent with ADLs: walks independently; cooks independently; bathes self independently.  Former smoker: quit 40 years ago; smoked 1 ppd x 1-2 years.  Drinks 1 alcoholic drink per week.  Denies drug use. Lives with two daughters.  Grew up in [place] and moved [place] the [place].  Independent with ADLs: walks independently; cooks independently; bathes self independently.  Former smoker: quit 40 years ago; smoked 1 ppd x 1-2 years.  Drinks 1 alcoholic drink per week.  Denies drug use.

## 2019-07-19 NOTE — H&P ADULT - ASSESSMENT
Ms. Bingham is a 69 year old female with a past medical history of peptic ulcers, rheumatoid arthritis, hypertension, and anemia who presented to the emergency department for 1 day of 3 episodes of dark bloody bowel movements and right lower quadrant and left lower quadrant abdominal pain.

## 2019-07-19 NOTE — H&P ADULT - PROBLEM SELECTOR PLAN 6
IMPROVE VTE Individual Risk Assessment  RISK                                                                Points  [  ] Previous VTE                                                  3  [  ] Thrombophilia                                               2  [  ] Lower limb paralysis                                      2  [  ] Current Cancer                                              2   [  ] Immobilization > 24 hrs                                1  [  ] ICU/CCU stay > 24 hours                              1  [X] Age > 60                                                      1  IMPROVE VTE Score 1  IMPROVE Score 0-1: Low Risk, No VTE prophylaxis required for most patients, encourage ambulation. IMPROVE VTE Individual Risk Assessment  RISK                                                                Points  [  ] Previous VTE                                                  3  [  ] Thrombophilia                                               2  [  ] Lower limb paralysis                                      2  [  ] Current Cancer                                              2   [  ] Immobilization > 24 hrs                                1  [  ] ICU/CCU stay > 24 hours                              1  [X] Age > 60                                                      1  IMPROVE VTE Score 1  IMPROVE Score 0-1: Low Risk, No VTE prophylaxis required for most patients, encourage ambulation. SCDs

## 2019-07-19 NOTE — H&P ADULT - HISTORY OF PRESENT ILLNESS
Ms. Bingham is a 69 year old female with a past medical history of peptic ulcers, rheumatoid arthritis, hypertension, and anemia who presented to the emergency department for 1 day of 3 episodes of dark bloody bowel movements and right lower quadrant and left lower quadrant abdominal pain. She reports the symptoms started the morning on Thursday, 7/18. She describes the pain as aching and cramping and rates the pain a 3/10. She opal to [facility] on Thursday and was told to come to the emergency department. She reports poor appetite and the last meal she had was cereal on Thursday morning. Her stool was mildly loose. She denies any back pain, diarrhea, radiation, fever, shortness of breath, or chest pain. Of note, she reports she went to a birthday party on Wednesday 7/17 and ate spicy  food.    She was admitted to [facility] on 2/6/18 for increased urinary frequency and mild lower back pain, found to have pyelonephritis and gram negative rods growing in blood cultures. An Echo was performed on 3/22/19, which showed mild to moderate tricuspid regurgitation, mild mitral regurgitation, LVEF 59%. While in the hospital, the patient's Hgb dropped to 7.3 and had 2 episodes of melena with lightheadedness. She had an Upper GI endoscopy performed on 2/12/18, which showed mild chronic antral gastritis, mild chronic gastric carditis. She had an Upper GI endoscopy on 12/21/18, which showed normal upper endoscopy and history of esophageal ulcer that hadresolved. Ms. Bingham is a 69 year old female with a past medical history of peptic ulcers, rheumatoid arthritis, hypertension, and anemia who presented to the emergency department for 1 day of 3 episodes of dark bloody bowel movements and right lower quadrant and left lower quadrant abdominal pain. She reports the symptoms started the morning on Thursday, 7/18. She describes the pain as aching and cramping and rates the pain a 3/10. She opal to [facility] on Thursday and was told to come to the emergency department. She reports poor appetite and the last meal she had was cereal on Thursday morning. Her stool was mildly loose. She denies any back pain, diarrhea, radiation, fever, shortness of breath, or chest pain. Of note, she reports she went to a birthday party on Wednesday 7/17 and ate spicy  food.    She was admitted to [facility] on 2/6/18 for increased urinary frequency and mild lower back pain, found to have pyelonephritis and gram negative rods growing in blood cultures. While in the hospital, the patient's Hgb dropped to 7.3 and had 2 episodes of melena with associated lightheadedness. She was discharged on 2/7/18 on Sucralfate 1 g PO BID and Protonix 40 mg PO daily for peptic ulcer and ciprofloxacin 500 mg PO BID for UTI. She had an Upper GI endoscopy performed on 2/8/18, which showed mild chronic antral gastritis, mild chronic gastric carditis. She then had a follow-up upper GI endoscopy on 12/21/18, which showed normal upper endoscopy and history of esophageal ulcer that had resolved. It was recommended for her to continue pantoprazole for another 3 months.    In the ED, T 98.3 F, heart rate 77 bpm, blood pressure 132/62, respiration rate 18, SpO2 100% on room air. Normal WBC of 9.91. Low Hgb 11.2 (7/18, 20:35), which dropped to 9.3 (7/19, 01:43). Hct low at 28.6 (L), elevated .1 (H). Normal PT, INR, aPTT. CMP unremarkable. UA had small amount of blood, trace leukocyte esterase, occasional bacteria, negative nitrite. CT abdomen and pelvis with oral and IV contrast: no bowel obstruction or evidence of acute inflammation. Positive FOBT. She received 15 mg Toradol IV, 1 L normal saline IV bolus. EKG: normal sinus rhythm with non-specific ST and T wave abnormality.    Of note, Ms. Bingham was seen by cardiologist, Dr. Venugopal Palla on 3/28/19 for dizziness. An Echo was performed on 3/22/19, which showed mild to moderate tricuspid regurgitation, mild mitral regurgitation, LVEF 59%. An EKG showed normal sinus rhythm, low QRS voltage. Ms. Bingham is a 69 year old female with a past medical history of peptic ulcers, rheumatoid arthritis, hypertension, and anemia who presented to the emergency department for 1 day of 3 episodes of dark bloody bowel movements and right lower quadrant and left lower quadrant abdominal pain. She reports the symptoms started the morning on Thursday, 7/18. She describes the pain as aching and cramping and rates the pain a 3/10. She opal to [facility] on Thursday and was told to come to the emergency department. She reports poor appetite and the last meal she had was cereal on Thursday morning. Her stool was mildly loose. She denies any back pain, diarrhea, radiation, fever, shortness of breath, or chest pain. Of note, she reports she went to a birthday party on Wednesday 7/17 and ate spicy  food.    She was admitted to [facility] on 2/6/18 for increased urinary frequency and mild lower back pain, found to have pyelonephritis and gram negative rods growing in blood cultures. While in the hospital, the patient's Hgb dropped to 7.3 and had 2 episodes of melena with associated lightheadedness. She was discharged on 2/7/18 on Sucralfate 1 g PO BID and Protonix 40 mg PO daily for peptic ulcer and ciprofloxacin 500 mg PO BID for UTI. She had an Upper GI endoscopy performed on 2/8/18, which showed mild chronic antral gastritis, mild chronic gastric carditis. She then had a follow-up upper GI endoscopy on 12/21/18, which showed normal upper endoscopy and history of esophageal ulcer that had resolved. It was recommended for her to continue pantoprazole for another 3 months.    In the ED, T 98.3 F, heart rate 77 bpm, blood pressure 132/62, respiration rate 18, SpO2 100% on room air. Normal WBC of 9.91. Low Hgb 11.2 (7/18, 20:35), which dropped to 9.3 (7/19, 01:43). Hct low at 28.6 (L), elevated .1 (H). Normal PT, INR, aPTT. CMP unremarkable. UA had small amount of blood, trace leukocyte esterase, occasional bacteria, negative nitrite. CT abdomen and pelvis with oral and IV contrast: no bowel obstruction or evidence of acute inflammation. Positive FOBT. She received 15 mg Toradol IV, 1 L normal saline IV bolus. EKG: normal sinus rhythm with non-specific ST and T wave abnormality.    Of note, Ms. Bingham was seen by cardiologist, Dr. Venugopal Palla on 3/28/19 for dizziness. An Echo was performed on 3/22/19, which showed mild to moderate tricuspid regurgitation, mild mitral regurgitation, LVEF 59%. An EKG showed normal sinus rhythm, low QRS voltage. Also of note, the patient has a family vacation on Tuesday 7/23 to [place] and would lie to be able to attend.

## 2019-07-19 NOTE — H&P ADULT - NSHPOUTPATIENTPROVIDERS_GEN_ALL_CORE
PCP: Dr. Mandel  Rheumatologist: Dr. Bria Larios  Cardiologist: Dr. Bonilla  Pharmacy: [facility]

## 2019-07-19 NOTE — CONSULT NOTE ADULT - PROBLEM SELECTOR RECOMMENDATION 9
hx of esophageal ulcers  ob+;  appears resolved, passed yellow/brown stool this am; HD stable  npo/ivf  cont ppi bid  monitor cbc, transfuse prn  avoid nsaids  will plan for egd today for further eval as per dw pt/family agreeable

## 2019-07-20 ENCOUNTER — TRANSCRIPTION ENCOUNTER (OUTPATIENT)
Age: 69
End: 2019-07-20

## 2019-07-20 VITALS
DIASTOLIC BLOOD PRESSURE: 64 MMHG | RESPIRATION RATE: 17 BRPM | TEMPERATURE: 98 F | HEART RATE: 60 BPM | SYSTOLIC BLOOD PRESSURE: 106 MMHG | OXYGEN SATURATION: 97 %

## 2019-07-20 LAB
ANION GAP SERPL CALC-SCNC: 5 MMOL/L — SIGNIFICANT CHANGE UP (ref 5–17)
BUN SERPL-MCNC: 9 MG/DL — SIGNIFICANT CHANGE UP (ref 7–23)
CALCIUM SERPL-MCNC: 8.3 MG/DL — LOW (ref 8.5–10.1)
CHLORIDE SERPL-SCNC: 115 MMOL/L — HIGH (ref 96–108)
CO2 SERPL-SCNC: 25 MMOL/L — SIGNIFICANT CHANGE UP (ref 22–31)
CREAT SERPL-MCNC: 0.75 MG/DL — SIGNIFICANT CHANGE UP (ref 0.5–1.3)
GLUCOSE SERPL-MCNC: 92 MG/DL — SIGNIFICANT CHANGE UP (ref 70–99)
HCT VFR BLD CALC: 31.2 % — LOW (ref 34.5–45)
HCV AB S/CO SERPL IA: 0.14 S/CO — SIGNIFICANT CHANGE UP (ref 0–0.99)
HCV AB SERPL-IMP: SIGNIFICANT CHANGE UP
HGB BLD-MCNC: 9.6 G/DL — LOW (ref 11.5–15.5)
MCHC RBC-ENTMCNC: 30.8 GM/DL — LOW (ref 32–36)
MCHC RBC-ENTMCNC: 32.2 PG — SIGNIFICANT CHANGE UP (ref 27–34)
MCV RBC AUTO: 104.7 FL — HIGH (ref 80–100)
NRBC # BLD: 0 /100 WBCS — SIGNIFICANT CHANGE UP (ref 0–0)
PLATELET # BLD AUTO: 246 K/UL — SIGNIFICANT CHANGE UP (ref 150–400)
POTASSIUM SERPL-MCNC: 4.4 MMOL/L — SIGNIFICANT CHANGE UP (ref 3.5–5.3)
POTASSIUM SERPL-SCNC: 4.4 MMOL/L — SIGNIFICANT CHANGE UP (ref 3.5–5.3)
RBC # BLD: 2.98 M/UL — LOW (ref 3.8–5.2)
RBC # FLD: 14.5 % — SIGNIFICANT CHANGE UP (ref 10.3–14.5)
SODIUM SERPL-SCNC: 145 MMOL/L — SIGNIFICANT CHANGE UP (ref 135–145)
WBC # BLD: 5.87 K/UL — SIGNIFICANT CHANGE UP (ref 3.8–10.5)
WBC # FLD AUTO: 5.87 K/UL — SIGNIFICANT CHANGE UP (ref 3.8–10.5)

## 2019-07-20 PROCEDURE — 86901 BLOOD TYPING SEROLOGIC RH(D): CPT

## 2019-07-20 PROCEDURE — 86850 RBC ANTIBODY SCREEN: CPT

## 2019-07-20 PROCEDURE — 93005 ELECTROCARDIOGRAM TRACING: CPT

## 2019-07-20 PROCEDURE — 82728 ASSAY OF FERRITIN: CPT

## 2019-07-20 PROCEDURE — 85018 HEMOGLOBIN: CPT

## 2019-07-20 PROCEDURE — 85027 COMPLETE CBC AUTOMATED: CPT

## 2019-07-20 PROCEDURE — 80048 BASIC METABOLIC PNL TOTAL CA: CPT

## 2019-07-20 PROCEDURE — 82272 OCCULT BLD FECES 1-3 TESTS: CPT

## 2019-07-20 PROCEDURE — 81001 URINALYSIS AUTO W/SCOPE: CPT

## 2019-07-20 PROCEDURE — 83540 ASSAY OF IRON: CPT

## 2019-07-20 PROCEDURE — 86900 BLOOD TYPING SEROLOGIC ABO: CPT

## 2019-07-20 PROCEDURE — 80053 COMPREHEN METABOLIC PANEL: CPT

## 2019-07-20 PROCEDURE — 74177 CT ABD & PELVIS W/CONTRAST: CPT

## 2019-07-20 PROCEDURE — 96374 THER/PROPH/DIAG INJ IV PUSH: CPT | Mod: XU

## 2019-07-20 PROCEDURE — 82746 ASSAY OF FOLIC ACID SERUM: CPT

## 2019-07-20 PROCEDURE — 85730 THROMBOPLASTIN TIME PARTIAL: CPT

## 2019-07-20 PROCEDURE — 86803 HEPATITIS C AB TEST: CPT

## 2019-07-20 PROCEDURE — 85045 AUTOMATED RETICULOCYTE COUNT: CPT

## 2019-07-20 PROCEDURE — 82607 VITAMIN B-12: CPT

## 2019-07-20 PROCEDURE — 88305 TISSUE EXAM BY PATHOLOGIST: CPT

## 2019-07-20 PROCEDURE — 36415 COLL VENOUS BLD VENIPUNCTURE: CPT

## 2019-07-20 PROCEDURE — 88312 SPECIAL STAINS GROUP 1: CPT

## 2019-07-20 PROCEDURE — 96361 HYDRATE IV INFUSION ADD-ON: CPT

## 2019-07-20 PROCEDURE — 83690 ASSAY OF LIPASE: CPT

## 2019-07-20 PROCEDURE — 83550 IRON BINDING TEST: CPT

## 2019-07-20 PROCEDURE — 99285 EMERGENCY DEPT VISIT HI MDM: CPT | Mod: 25

## 2019-07-20 PROCEDURE — 85014 HEMATOCRIT: CPT

## 2019-07-20 PROCEDURE — 85610 PROTHROMBIN TIME: CPT

## 2019-07-20 PROCEDURE — 99239 HOSP IP/OBS DSCHRG MGMT >30: CPT

## 2019-07-20 RX ORDER — METHOTREXATE 2.5 MG/1
12.5 TABLET ORAL
Qty: 0 | Refills: 0 | DISCHARGE

## 2019-07-20 RX ORDER — PANTOPRAZOLE SODIUM 20 MG/1
1 TABLET, DELAYED RELEASE ORAL
Qty: 60 | Refills: 0
Start: 2019-07-20 | End: 2019-08-18

## 2019-07-20 RX ORDER — PANTOPRAZOLE SODIUM 20 MG/1
1 TABLET, DELAYED RELEASE ORAL
Qty: 0 | Refills: 0 | DISCHARGE
Start: 2019-07-20 | End: 2019-08-18

## 2019-07-20 RX ORDER — FERROUS SULFATE 325(65) MG
5 TABLET ORAL
Qty: 150 | Refills: 0
Start: 2019-07-20 | End: 2019-08-18

## 2019-07-20 RX ADMIN — Medication 50 MILLIGRAM(S): at 05:20

## 2019-07-20 RX ADMIN — Medication 2.5 MILLIGRAM(S): at 05:21

## 2019-07-20 RX ADMIN — LISINOPRIL 20 MILLIGRAM(S): 2.5 TABLET ORAL at 05:21

## 2019-07-20 NOTE — DISCHARGE NOTE NURSING/CASE MANAGEMENT/SOCIAL WORK - NSDCDPATPORTLINK_GEN_ALL_CORE
You can access the [facility] Patient Portal, offered by [facility], by registering with the following website: [email or web address]

## 2019-07-20 NOTE — DISCHARGE NOTE PROVIDER - HOSPITAL COURSE
Ms. Bingham is a 69 year old female with a past medical history of peptic ulcers, rheumatoid arthritis, hypertension, and anemia who presented to the emergency department for 1 day of 3 episodes of dark bloody bowel movements and right lower quadrant and left lower quadrant abdominal pain. She reports the symptoms started the morning on Thursday, 7/18. She describes the pain as aching and cramping and rates the pain a 3/10. She opal to [facility] on Thursday and was told to come to the emergency department. She reports poor appetite and the last meal she had was cereal on Thursday morning. Her stool was mildly loose. She denies any back pain, diarrhea, radiation, fever, shortness of breath, or chest pain. Of note, she reports she went to a birthday party on Wednesday 7/17 and ate spicy  food.        She was admitted to [facility] on 2/6/18 for increased urinary frequency and mild lower back pain, found to have pyelonephritis and gram negative rods growing in blood cultures. While in the hospital, the patient's Hgb dropped to 7.3 and had 2 episodes of melena with associated lightheadedness. She was discharged on 2/7/18 on Sucralfate 1 g PO BID and Protonix 40 mg PO daily for peptic ulcer and ciprofloxacin 500 mg PO BID for UTI. She had an Upper GI endoscopy performed on 2/8/18, which showed mild chronic antral gastritis, mild chronic gastric carditis. She then had a follow-up upper GI endoscopy on 12/21/18, which showed normal upper endoscopy and history of esophageal ulcer that had resolved. It was recommended for her to continue pantoprazole for another 3 months.        In the ED, T 98.3 F, heart rate 77 bpm, blood pressure 132/62, respiration rate 18, SpO2 100% on room air. Normal WBC of 9.91. Low Hgb 11.2 (7/18, 20:35), which dropped to 9.3 (7/19, 01:43). Hct low at 28.6 (L), elevated .1 (H). Normal PT, INR, aPTT. CMP unremarkable. UA had small amount of blood, trace leukocyte esterase, occasional bacteria, negative nitrite. CT abdomen and pelvis with oral and IV contrast: no bowel obstruction or evidence of acute inflammation. Positive FOBT. She received 15 mg Toradol IV, 1 L normal saline IV bolus. EKG: normal sinus rhythm with non-specific ST and T wave abnormality.        Of note, Ms. Bingham was seen by cardiologist, Dr. Venugopal Palla on 3/28/19 for dizziness. An Echo was performed on 3/22/19, which showed mild to moderate tricuspid regurgitation, mild mitral regurgitation, LVEF 59%. An EKG showed normal sinus rhythm, low QRS voltage. Also of note, the patient has a family vacation on Tuesday 7/23 to [place] and would lie to be able to attend.         GIB: hx of esophageal ulcers , FOBT+;  appears resolved, passed yellow/brown stool this am; HD stable, avoid nsaids    s/p EGD: small non bleeding linear ulcers at GE junction    (gastric side)    ? drug induced; related to iron    continue proton pump inhibitor.    change iron supplementation to liquid          Abdominal pain: resolved    ct neg for acute gi pathology        Acute anemia: 2/2 acute GI blood loss. Hb stable at 9. continue Po Iron liquid.         I spent 46 min for discharge. I notified pcp.     PHYSICAL EXAM        Constitutional: NAD, well-groomed, well-developed    HEENT: PERRLA, EOMI, Normal Hearing, MMM    Neck: No LAD, No JVD    Back: Normal spine flexure, No CVA tenderness    Respiratory: CTAB/L     Cardiovascular: S1 and S2, RRR, no M/G/R    Gastrointestinal: BS+, soft, NT/ND    Extremities: No peripheral edema    Vascular: 2+ peripheral pulses    Neurological: A/O x 3, no focal deficits    Skin: No rashes

## 2019-07-20 NOTE — PROGRESS NOTE ADULT - SUBJECTIVE AND OBJECTIVE BOX
DEPARTMENT OF ANESTHESIA  POST ANESTHETIC EVALUATION    The Patient was interviewed and evaluated.  The patient is S/P an EGD    Vital Signs Last 24 Hrs  T(C): 36.9 (20 Jul 2019 07:57), Max: 36.9 (20 Jul 2019 07:57)  T(F): 98.5 (20 Jul 2019 07:57), Max: 98.5 (20 Jul 2019 07:57)  HR: 60 (20 Jul 2019 07:57) (60 - 82)  BP: 106/64 (20 Jul 2019 07:57) (106/64 - 161/63)  BP(mean): --  RR: 17 (20 Jul 2019 07:57) (17 - 19)  SpO2: 97% (20 Jul 2019 07:57) (97% - 100%)    Evaluation:  The patient is doing well     (x ) No apparent complications or complaints regarding anesthesia care at this time  (x ) All questions were answered    Condition:  (x ) Stable      ( ) Guarded      ( ) Critical    Recommendations:  ( ) None     ( ) Other:
s/p  upper gastrointestinal endoscopy    small non bleeding linear ulcers at GE junction  (gastric side)  ? drug induced; related to iron    rec:   proton pump inhibitor daily  dc planning  change iron supplementation to liquid
Overnight events:  Patient seen and examined s/p  upper gastrointestinal endoscopy  small non bleeding linear ulcers at GE junction  (gastric side). Patient offers no complaint   tolerating diet     gentamicin (Chills)      ferrous    sulfate 325 milliGRAM(s) Oral daily  folic acid 1 milliGRAM(s) Oral daily  lisinopril 20 milliGRAM(s) Oral daily  metoprolol succinate ER 50 milliGRAM(s) Oral daily  pantoprazole  Injectable 40 milliGRAM(s) IV Push daily  predniSONE   Tablet 2.5 milliGRAM(s) Oral daily  sodium chloride 0.9%. 1000 milliLiter(s) IV Continuous <Continuous>  sodium chloride 0.9%. 1000 milliLiter(s) IV Continuous <Continuous>        FAMILY HISTORY:  Family history of myocardial infarction: Father  FH: throat cancer: Mother        Review of Systems:    General:  No wt loss, fevers, chills, night sweats, fatigue  Eyes:  Good vision, no reported pain  ENT:  No sore throat, pain, runny nose, dysphagia  CV:  No pain, palpitations, no lightheadedness  Resp:  No dyspnea, cough, tachypnea, wheezing  GI: see above  :  No pain, bleeding, incontinence, nocturia  Muscle:  No pain, weakness  Neuro:  No weakness, tingling, memory problems  Psych:  No fatigue, insomnia, mood problems, depression  Endocrine:  No polyuria, polydypsia, cold/heat intolerance  Heme:  No petechiae, ecchymosis, easy bruisability  Skin:  No rash, tattoos, scars, edema    Relevant Family History:   n/c    Relevant Social History: n/c      Physical Exam:  Vital Signs Last 24 Hrs  T(C): 36.9 (20 Jul 2019 07:57), Max: 36.9 (20 Jul 2019 07:57)  T(F): 98.5 (20 Jul 2019 07:57), Max: 98.5 (20 Jul 2019 07:57)  HR: 60 (20 Jul 2019 07:57) (60 - 82)  BP: 106/64 (20 Jul 2019 07:57) (106/64 - 161/63)  BP(mean): --  RR: 17 (20 Jul 2019 07:57) (17 - 19)  SpO2: 97% (20 Jul 2019 07:57) (97% - 100%)    General:  nad  HEENT:  NC/AT  Chest:  dec bs  Cardiovascular:  Regular rhythm, S1, S2  Abdomen:  Soft, non-tender, non-distended, on ramya no external hemorrhoids seen brown stool in rectal vault  Extremities:  no  edema  Skin:  No rash  Neuro/Psych:  A&O x3    Laboratory:                                 9.6    5.87  )-----------( 246      ( 20 Jul 2019 07:02 )             31.2   07-20    145  |  115<H>  |  9   ----------------------------<  92  4.4   |  25  |  0.75    Ca    8.3<L>      20 Jul 2019 07:02    TPro  6.5  /  Alb  3.2<L>  /  TBili  0.7  /  DBili  x   /  AST  19  /  ALT  25  /  AlkPhos  90  07-19

## 2019-07-20 NOTE — DISCHARGE NOTE PROVIDER - CARE PROVIDER_API CALL
Matthew Alcantar (DO)  Gastroenterology; Internal Medicine  [address] [place] [address]  Phone: [phone]  Fax: [phone]  Follow Up Time:     Magi Villanueva)  Internal Medicine  [address] [place] [address]  Phone: [phone]  Fax: [phone]  Follow Up Time:

## 2019-07-20 NOTE — DISCHARGE NOTE PROVIDER - NSDCCPCAREPLAN_GEN_ALL_CORE_FT
PRINCIPAL DISCHARGE DIAGNOSIS  Diagnosis: GIB (gastrointestinal bleeding)  Assessment and Plan of Treatment: 2/2 gastric ulcer , continue Protonix.

## 2019-07-20 NOTE — PROGRESS NOTE ADULT - PROBLEM SELECTOR PLAN 1
proton pump inhibitor daily  change iron supplementation to liquid  diet as tolerated   d/c planning

## 2019-07-23 NOTE — DISCUSSION/SUMMARY
[Home] : patient was discharged to home [FreeTextEntry3] : Patient is following up with GI doc. [Other:_____] : [unfilled]

## 2019-08-01 ENCOUNTER — OUTPATIENT (OUTPATIENT)
Dept: OUTPATIENT SERVICES | Facility: HOSPITAL | Age: 69
LOS: 1 days | End: 2019-08-01
Payer: MEDICAID

## 2019-08-01 DIAGNOSIS — Z98.890 OTHER SPECIFIED POSTPROCEDURAL STATES: Chronic | ICD-10-CM

## 2019-08-01 DIAGNOSIS — H25.9 UNSPECIFIED AGE-RELATED CATARACT: Chronic | ICD-10-CM

## 2019-08-01 PROCEDURE — G9001: CPT

## 2019-08-09 DIAGNOSIS — Z71.89 OTHER SPECIFIED COUNSELING: ICD-10-CM

## 2019-08-09 PROBLEM — K27.9 PEPTIC ULCER, SITE UNSPECIFIED, UNSPECIFIED AS ACUTE OR CHRONIC, WITHOUT HEMORRHAGE OR PERFORATION: Chronic | Status: ACTIVE | Noted: 2019-07-19

## 2019-08-09 PROBLEM — D64.9 ANEMIA, UNSPECIFIED: Chronic | Status: ACTIVE | Noted: 2019-07-18

## 2020-06-29 ENCOUNTER — APPOINTMENT (OUTPATIENT)
Dept: CARDIOLOGY | Facility: CLINIC | Age: 70
End: 2020-06-29
Payer: MEDICAID

## 2020-06-29 ENCOUNTER — LABORATORY RESULT (OUTPATIENT)
Age: 70
End: 2020-06-29

## 2020-06-29 ENCOUNTER — NON-APPOINTMENT (OUTPATIENT)
Age: 70
End: 2020-06-29

## 2020-06-29 VITALS
WEIGHT: 176 LBS | HEART RATE: 65 BPM | OXYGEN SATURATION: 100 % | DIASTOLIC BLOOD PRESSURE: 81 MMHG | HEIGHT: 65 IN | SYSTOLIC BLOOD PRESSURE: 153 MMHG | BODY MASS INDEX: 29.32 KG/M2

## 2020-06-29 PROCEDURE — 99214 OFFICE O/P EST MOD 30 MIN: CPT

## 2020-06-29 PROCEDURE — 93000 ELECTROCARDIOGRAM COMPLETE: CPT

## 2020-06-29 NOTE — REASON FOR VISIT
[Follow-Up - Clinic] : a clinic follow-up of [Dizziness] : dizziness [Hypertension] : hypertension [Medication Management] : Medication management [Hyperlipidemia] : hyperlipidemia [FreeTextEntry1] : Rheumatoid

## 2020-06-29 NOTE — PHYSICAL EXAM
[Normal Conjunctiva] : the conjunctiva exhibited no abnormalities [General Appearance - Well Developed] : well developed [Normal Jugular Venous A Waves Present] : normal jugular venous A waves present [No Oral Cyanosis] : no oral cyanosis [Respiration, Rhythm And Depth] : normal respiratory rhythm and effort [Auscultation Breath Sounds / Voice Sounds] : lungs were clear to auscultation bilaterally [Exaggerated Use Of Accessory Muscles For Inspiration] : no accessory muscle use [Chest Palpation] : palpation of the chest revealed no abnormalities [Heart Rate And Rhythm] : heart rate and rhythm were normal [Lungs Percussion] : the lungs were normal to percussion [Arterial Pulses Normal] : the arterial pulses were normal [Murmurs] : no murmurs present [Heart Sounds] : normal S1 and S2 [Veins - Varicosity Changes] : no varicosital changes were noted in the lower extremities [Edema] : no peripheral edema present [Abdomen Soft] : soft [Abdomen Mass (___ Cm)] : no abdominal mass palpated [Abdomen Tenderness] : non-tender [Nail Clubbing] : no clubbing of the fingernails [Abnormal Walk] : normal gait [] : no ischemic changes [Skin Color & Pigmentation] : normal skin color and pigmentation [Oriented To Time, Place, And Person] : oriented to person, place, and time [Skin Turgor] : normal skin turgor

## 2020-06-29 NOTE — REVIEW OF SYSTEMS
[Fever] : no fever [Blurry Vision] : no blurred vision [Earache] : no earache [Cough] : no cough [Shortness Of Breath] : no shortness of breath [Palpitations] : no palpitations [Heartburn] : heartburn [Dysphagia] : no dysphagia [Abdominal Pain] : no abdominal pain [Dysuria] : no dysuria [Joint Pain] : no joint pain [Anxiety] : no anxiety [Dizziness] : no dizziness [Confusion] : no confusion was observed

## 2020-06-29 NOTE — HISTORY OF PRESENT ILLNESS
[FreeTextEntry1] : Patient  with hx of rheumatoid arthritis , hypertension, HLD  came for follow up says she is doing well  \par \par Patient denies any other associated symptoms like palpitations or chest pain or shortness of breath .\par \par patients blood pressure is mild elevated ,  patient is taking lisinopril 10 mg instead of 20 mg , \par \par patient does have elevated total cholesterol with very high HDL ,  does have chronic anemia ( mild ) .\par \par Patient does have occasional reflux symptoms on eating certain type of food controlled with omeprazole  \par \par \par

## 2020-06-29 NOTE — ASSESSMENT
[FreeTextEntry1] :  \par \par Mild uncontrolled hypertension without immediate orthostasis .  patient  was advised to follow low salt diet , continue lisinopril 10 mg as she is taking currently , advised to increase to 20 mg po daily if her homeBP continue to be higher than 130 SBP , \par \par occasional skipped heart beat ? PAC\par \par Mild pure hypercholesteremia with high HDL , on atorvastatin 20 mg po daily , follow up of blood work \par \par chronic mild anemia ,\par \par Mild pulmonary hypertension  stable , mild MR   will obtain follow up echo to monitor PHTN \par \par

## 2020-06-30 LAB
25(OH)D3 SERPL-MCNC: 27.6 NG/ML
ALBUMIN SERPL ELPH-MCNC: 4.8 G/DL
ALP BLD-CCNC: 106 U/L
ALT SERPL-CCNC: 23 U/L
ANION GAP SERPL CALC-SCNC: 19 MMOL/L
APPEARANCE: CLEAR
AST SERPL-CCNC: 28 U/L
BASOPHILS # BLD AUTO: 0.09 K/UL
BASOPHILS NFR BLD AUTO: 1.3 %
BILIRUB SERPL-MCNC: 0.5 MG/DL
BILIRUBIN URINE: NEGATIVE
BLOOD URINE: NEGATIVE
BUN SERPL-MCNC: 16 MG/DL
CALCIUM SERPL-MCNC: 9.9 MG/DL
CHLORIDE SERPL-SCNC: 103 MMOL/L
CHOLEST SERPL-MCNC: 183 MG/DL
CHOLEST/HDLC SERPL: 2.2 RATIO
CO2 SERPL-SCNC: 19 MMOL/L
COLOR: COLORLESS
CREAT SERPL-MCNC: 0.8 MG/DL
EOSINOPHIL # BLD AUTO: 0.38 K/UL
EOSINOPHIL NFR BLD AUTO: 5.6 %
ESTIMATED AVERAGE GLUCOSE: 114 MG/DL
FERRITIN SERPL-MCNC: 133 NG/ML
FOLATE SERPL-MCNC: >20 NG/ML
GLUCOSE QUALITATIVE U: NEGATIVE
GLUCOSE SERPL-MCNC: 89 MG/DL
HBA1C MFR BLD HPLC: 5.6 %
HCT VFR BLD CALC: 41.4 %
HDLC SERPL-MCNC: 85 MG/DL
HGB BLD-MCNC: 12.7 G/DL
IMM GRANULOCYTES NFR BLD AUTO: 0.4 %
IRON SATN MFR SERPL: 37 %
IRON SERPL-MCNC: 136 UG/DL
KETONES URINE: NEGATIVE
LDLC SERPL CALC-MCNC: 73 MG/DL
LEUKOCYTE ESTERASE URINE: NEGATIVE
LYMPHOCYTES # BLD AUTO: 1.41 K/UL
LYMPHOCYTES NFR BLD AUTO: 20.7 %
MAN DIFF?: NORMAL
MCHC RBC-ENTMCNC: 30.7 GM/DL
MCHC RBC-ENTMCNC: 33.2 PG
MCV RBC AUTO: 108.4 FL
MONOCYTES # BLD AUTO: 0.45 K/UL
MONOCYTES NFR BLD AUTO: 6.6 %
NEUTROPHILS # BLD AUTO: 4.45 K/UL
NEUTROPHILS NFR BLD AUTO: 65.4 %
NITRITE URINE: NEGATIVE
PH URINE: 6.5
PLATELET # BLD AUTO: 299 K/UL
POTASSIUM SERPL-SCNC: 4.9 MMOL/L
PROT SERPL-MCNC: 7.2 G/DL
PROTEIN URINE: NEGATIVE
RBC # BLD: 3.82 M/UL
RBC # FLD: 15.9 %
SODIUM SERPL-SCNC: 141 MMOL/L
SPECIFIC GRAVITY URINE: 1.01
TIBC SERPL-MCNC: 370 UG/DL
TRIGL SERPL-MCNC: 123 MG/DL
TSH SERPL-ACNC: 1.39 UIU/ML
UIBC SERPL-MCNC: 233 UG/DL
UROBILINOGEN URINE: NORMAL
VIT B12 SERPL-MCNC: 636 PG/ML
WBC # FLD AUTO: 6.81 K/UL

## 2020-07-09 ENCOUNTER — APPOINTMENT (OUTPATIENT)
Dept: CARDIOLOGY | Facility: CLINIC | Age: 70
End: 2020-07-09
Payer: MEDICAID

## 2020-07-09 PROCEDURE — 93306 TTE W/DOPPLER COMPLETE: CPT

## 2020-08-22 ENCOUNTER — RX RENEWAL (OUTPATIENT)
Age: 70
End: 2020-08-22

## 2020-10-22 ENCOUNTER — APPOINTMENT (OUTPATIENT)
Dept: CARDIOLOGY | Facility: CLINIC | Age: 70
End: 2020-10-22
Payer: MEDICAID

## 2020-10-22 ENCOUNTER — NON-APPOINTMENT (OUTPATIENT)
Age: 70
End: 2020-10-22

## 2020-10-22 VITALS
SYSTOLIC BLOOD PRESSURE: 140 MMHG | HEART RATE: 48 BPM | WEIGHT: 184 LBS | OXYGEN SATURATION: 100 % | BODY MASS INDEX: 30.66 KG/M2 | DIASTOLIC BLOOD PRESSURE: 80 MMHG | HEIGHT: 65 IN

## 2020-10-22 PROCEDURE — 99214 OFFICE O/P EST MOD 30 MIN: CPT

## 2020-10-22 PROCEDURE — 93000 ELECTROCARDIOGRAM COMPLETE: CPT

## 2020-10-22 RX ORDER — PANTOPRAZOLE 40 MG/1
40 TABLET, DELAYED RELEASE ORAL
Qty: 1 | Refills: 0 | Status: ACTIVE | COMMUNITY

## 2020-10-22 NOTE — ASSESSMENT
[FreeTextEntry1] :  \par \par Mild uncontrolled hypertension without immediate orthostasis .  patient  was advised to follow low salt diet , continue lisinopril 10 mg as she is taking currently , advised to increase to 20 mg po daily if her home BP continue to be higher than 130 SBP , \par \par occasional skipped heart beat ? PAC\par \par Mild pure hypercholesteremia with high HDL , on atorvastatin 20 mg po daily , follow up of blood work \par \par chronic mild anemia ,\par \par Mild pulmonary hypertension  stable , mild MR   will obtain follow up echo to monitor PHTN \par \par

## 2020-10-22 NOTE — REVIEW OF SYSTEMS
[Fever] : no fever [Blurry Vision] : no blurred vision [Earache] : no earache [Shortness Of Breath] : no shortness of breath [Palpitations] : no palpitations [Cough] : no cough [Abdominal Pain] : no abdominal pain [Heartburn] : no heartburn [Dysphagia] : no dysphagia [Dysuria] : no dysuria [Joint Pain] : no joint pain [Dizziness] : no dizziness [Confusion] : no confusion was observed [Anxiety] : no anxiety

## 2020-10-22 NOTE — HISTORY OF PRESENT ILLNESS
[FreeTextEntry1] : Patient  with hx of rheumatoid arthritis , hypertension, HLD  came for follow up says she is doing well  . patient denies any chest pain or shortness of breath or palpitation or dizziness \par .patients blood pressure is mild elevated ,  patient is taking lisinopril 10 mg instead of 20 mg , \par \par patient does have elevated total cholesterol with very high HDL ,  does have chronic anemia ( mild ) .\par \par Patient does have occasional reflux symptoms on eating certain type of food controlled with omeprazole\par \par patient blood work showed normal lipid profile   \par \par \par

## 2020-10-22 NOTE — REASON FOR VISIT
[Follow-Up - Clinic] : a clinic follow-up of [Dizziness] : dizziness [Hyperlipidemia] : hyperlipidemia [Hypertension] : hypertension [Medication Management] : Medication management [FreeTextEntry1] : Rheumatoid arthritis

## 2020-11-27 NOTE — ED ADULT NURSE NOTE - PAIN RATING/NUMBER SCALE (0-10): ACTIVITY
November 27, 2020     Patient: Ann Burger   YOB: 1970   Date of Visit: 11/27/2020       To Whom it May Concern:    Ann Burger was seen in my clinic on 11/27/2020 at 9:15 am.    Please excuse Ann for her absence from work on the date listed above to be able to make her appointment.    Sincerely,         Jamil Orozco MD    Medical information is confidential and cannot be disclosed without the written consent of the patient or her representative.       2

## 2021-01-08 ENCOUNTER — NON-APPOINTMENT (OUTPATIENT)
Age: 71
End: 2021-01-08

## 2021-01-08 ENCOUNTER — APPOINTMENT (OUTPATIENT)
Dept: CARDIOLOGY | Facility: CLINIC | Age: 71
End: 2021-01-08
Payer: MEDICAID

## 2021-01-08 VITALS
HEIGHT: 65 IN | BODY MASS INDEX: 30.66 KG/M2 | SYSTOLIC BLOOD PRESSURE: 139 MMHG | DIASTOLIC BLOOD PRESSURE: 83 MMHG | WEIGHT: 184 LBS | OXYGEN SATURATION: 100 % | HEART RATE: 75 BPM

## 2021-01-08 VITALS — SYSTOLIC BLOOD PRESSURE: 120 MMHG | DIASTOLIC BLOOD PRESSURE: 80 MMHG

## 2021-01-08 PROCEDURE — 99214 OFFICE O/P EST MOD 30 MIN: CPT

## 2021-01-08 PROCEDURE — 93000 ELECTROCARDIOGRAM COMPLETE: CPT

## 2021-01-08 PROCEDURE — 99072 ADDL SUPL MATRL&STAF TM PHE: CPT

## 2021-01-08 RX ORDER — LEFLUNOMIDE 20 MG/1
20 TABLET, FILM COATED ORAL DAILY
Refills: 0 | Status: ACTIVE | COMMUNITY

## 2021-01-08 NOTE — REVIEW OF SYSTEMS
[Fever] : no fever [Blurry Vision] : no blurred vision [Earache] : no earache [Mouth Sores] : no mouth sores [Shortness Of Breath] : no shortness of breath [Chest Pain] : no chest pain [Palpitations] : no palpitations [Cough] : no cough [Abdominal Pain] : no abdominal pain [Heartburn] : no heartburn [Dysphagia] : no dysphagia [Dysuria] : no dysuria [Joint Pain] : no joint pain [Dizziness] : no dizziness [Confusion] : no confusion was observed [Anxiety] : no anxiety

## 2021-01-08 NOTE — HISTORY OF PRESENT ILLNESS
[FreeTextEntry1] : Patient  with hx of rheumatoid arthritis , hypertension, HLD  came for follow up says she is doing well  .patient denies any chest pain or shortness of breath or palpitation or dizziness \par \par patients blood pressure is controlled on lisinopril 20 mg pod aily , .\par \par Patient does have occasional reflux symptoms on eating certain type of food controlled with omeprazole\par \par patient blood work showed normal lipid profile   \par \par patient is under care rheumatologist \par \par \par

## 2021-01-08 NOTE — ASSESSMENT
[FreeTextEntry1] :  \par \par controlled hypertension without immediate orthostasis .patient  was advised to follow low salt diet , advised to continue lisinopril  20 mg po daily if her home BP continue to be higher than 130 SBP , \par \par occasional skipped heart beat ? PAC\par \par Mild pure hypercholesteremia with high HDL , now controlled  on atorvastatin 20 mg po daily , follow up of blood work \par \par chronic mild anemia ,  patient is on methotrexate , continue monitoring \par \par Mild pulmonary hypertension  stable , \par \par Mild to moderate MR , moderate TR  continue to monitor \par

## 2021-01-12 NOTE — ED PROVIDER NOTE - SEVERITY
MODERATE Quality 431: Preventive Care And Screening: Unhealthy Alcohol Use - Screening: Patient screened for unhealthy alcohol use using a single question and scores less than 2 times per year Quality 130: Documentation Of Current Medications In The Medical Record: Current Medications Documented Detail Level: Detailed Quality 226: Preventive Care And Screening: Tobacco Use: Screening And Cessation Intervention: Patient screened for tobacco use and is an ex/non-smoker

## 2021-02-07 ENCOUNTER — RX RENEWAL (OUTPATIENT)
Age: 71
End: 2021-02-07

## 2021-05-03 NOTE — DISCHARGE NOTE NURSING/CASE MANAGEMENT/SOCIAL WORK - NSDCPEFALRISK_GEN_ALL_CORE
Patient information on fall and injury prevention
[No studies available for review at this time.] : No studies available for review at this time.

## 2021-09-09 ENCOUNTER — APPOINTMENT (OUTPATIENT)
Dept: CARDIOLOGY | Facility: CLINIC | Age: 71
End: 2021-09-09
Payer: MEDICAID

## 2021-09-09 ENCOUNTER — NON-APPOINTMENT (OUTPATIENT)
Age: 71
End: 2021-09-09

## 2021-09-09 VITALS
WEIGHT: 168 LBS | SYSTOLIC BLOOD PRESSURE: 128 MMHG | BODY MASS INDEX: 27.99 KG/M2 | DIASTOLIC BLOOD PRESSURE: 76 MMHG | HEART RATE: 74 BPM | OXYGEN SATURATION: 100 % | HEIGHT: 65 IN

## 2021-09-09 PROCEDURE — 93000 ELECTROCARDIOGRAM COMPLETE: CPT

## 2021-09-09 PROCEDURE — 99214 OFFICE O/P EST MOD 30 MIN: CPT

## 2021-09-15 ENCOUNTER — LABORATORY RESULT (OUTPATIENT)
Age: 71
End: 2021-09-15

## 2021-09-16 LAB
25(OH)D3 SERPL-MCNC: 27.9 NG/ML
ALBUMIN SERPL ELPH-MCNC: 4.2 G/DL
ALP BLD-CCNC: 107 U/L
ALT SERPL-CCNC: 14 U/L
ANION GAP SERPL CALC-SCNC: 13 MMOL/L
AST SERPL-CCNC: 23 U/L
BASOPHILS # BLD AUTO: 0.11 K/UL
BASOPHILS NFR BLD AUTO: 2 %
BILIRUB SERPL-MCNC: 0.3 MG/DL
BUN SERPL-MCNC: 17 MG/DL
CALCIUM SERPL-MCNC: 9.3 MG/DL
CHLORIDE SERPL-SCNC: 107 MMOL/L
CHOLEST SERPL-MCNC: 139 MG/DL
CO2 SERPL-SCNC: 24 MMOL/L
CREAT SERPL-MCNC: 0.74 MG/DL
EOSINOPHIL # BLD AUTO: 0.61 K/UL
EOSINOPHIL NFR BLD AUTO: 11.3 %
ESTIMATED AVERAGE GLUCOSE: 114 MG/DL
GLUCOSE SERPL-MCNC: 92 MG/DL
HBA1C MFR BLD HPLC: 5.6 %
HCT VFR BLD CALC: 33.7 %
HDLC SERPL-MCNC: 52 MG/DL
HGB BLD-MCNC: 10.1 G/DL
IMM GRANULOCYTES NFR BLD AUTO: 0.4 %
LDLC SERPL CALC-MCNC: 63 MG/DL
LYMPHOCYTES # BLD AUTO: 0.89 K/UL
LYMPHOCYTES NFR BLD AUTO: 16.4 %
MAN DIFF?: NORMAL
MCHC RBC-ENTMCNC: 30 GM/DL
MCHC RBC-ENTMCNC: 32.9 PG
MCV RBC AUTO: 109.8 FL
MONOCYTES # BLD AUTO: 0.77 K/UL
MONOCYTES NFR BLD AUTO: 14.2 %
NEUTROPHILS # BLD AUTO: 3.02 K/UL
NEUTROPHILS NFR BLD AUTO: 55.7 %
NONHDLC SERPL-MCNC: 87 MG/DL
PLATELET # BLD AUTO: 267 K/UL
POTASSIUM SERPL-SCNC: 5.3 MMOL/L
PROT SERPL-MCNC: 6.5 G/DL
RBC # BLD: 3.07 M/UL
RBC # FLD: 18.8 %
SODIUM SERPL-SCNC: 143 MMOL/L
TRIGL SERPL-MCNC: 121 MG/DL
TSH SERPL-ACNC: 1.3 UIU/ML
WBC # FLD AUTO: 5.42 K/UL

## 2021-10-18 NOTE — ASSESSMENT
[FreeTextEntry1] :  Patient with above hx , who is having fatigue , unclear etiology may be decondition , no evidence of orthostasis ,  , will obtain routine blood work , will obtain chemical stress test ( she can not walk due to knees arthritis  ) \par \par controlled hypertension without immediate orthostasis .patient  was advised to follow low salt diet , advised to continue lisinopril  20 mg po daily if her home BP continue to be higher than 130 SBP , \par \par occasional skipped heart beat ? PAC\par \par Mild pure hypercholesteremia with high HDL , now controlled  on atorvastatin 20 mg po daily , follow up of blood work \par \par chronic mild anemia ,  patient is on methotrexate , continue monitoring \par \par Mild pulmonary hypertension  stable , \par \par Mild to moderate MR , moderate TR  continue to monitor \par \par follow up after \par

## 2021-10-18 NOTE — HISTORY OF PRESENT ILLNESS
[FreeTextEntry1] : Patient  with hx of rheumatoid arthritis , hypertension, HLD  came for follow up says she get tired  easily since  she had covid in april 2021 , since then she get tired easily by standing, doing activity with sob no chest pain m   denies any associated dizziness , patient feels like she has to take rest ,  patient stopped prednisone on her own   \par \par patient denies any chest pain  or palpitation or dizziness \par \par patients blood pressure is controlled on lisinopril 20 mg pod aily , .\par \par Patient does have occasional reflux symptoms on eating certain type of food controlled with omeprazole\par \par patient blood work showed normal lipid profile   \par \par patient is under care rheumatologist \par \par \par

## 2021-10-18 NOTE — CARDIOLOGY SUMMARY
[de-identified] : 9/9/21 sinus rhythm low QRS voltage  [de-identified] : 7/9/20 Mild to moderate MR , moderate TR RVSP 46 mm hg ( no significant change from )

## 2021-10-18 NOTE — PHYSICAL EXAM
[Well Developed] : well developed [Well Nourished] : well nourished [No Acute Distress] : no acute distress [Normal Conjunctiva] : normal conjunctiva [Normal Venous Pressure] : normal venous pressure [No Carotid Bruit] : no carotid bruit [Normal Rate] : normal [Normal S1] : normal S1 [Normal S2] : normal S2 [No Murmur] : no murmurs heard [No Pitting Edema] : no pitting edema present [2+] : left 2+ [No Abnormalities] : the abdominal aorta was not enlarged and no bruit was heard [Clear Lung Fields] : clear lung fields [Good Air Entry] : good air entry [No Respiratory Distress] : no respiratory distress  [Soft] : abdomen soft [Non Tender] : non-tender [Normal Bowel Sounds] : normal bowel sounds [Normal Gait] : normal gait [No Edema] : no edema [No Cyanosis] : no cyanosis [No Clubbing] : no clubbing [No Varicosities] : no varicosities [No Rash] : no rash [No Skin Lesions] : no skin lesions [Moves all extremities] : moves all extremities [No Focal Deficits] : no focal deficits [Normal Speech] : normal speech [Alert and Oriented] : alert and oriented [Normal memory] : normal memory [S3] : no S3 [S4] : no S4 [Right Carotid Bruit] : no bruit heard over the right carotid [Left Carotid Bruit] : no bruit heard over the left carotid [Right Femoral Bruit] : no bruit heard over the right femoral artery [Left Femoral Bruit] : no bruit heard over the left femoral artery

## 2021-10-27 ENCOUNTER — NON-APPOINTMENT (OUTPATIENT)
Age: 71
End: 2021-10-27

## 2021-11-03 ENCOUNTER — APPOINTMENT (OUTPATIENT)
Dept: GASTROENTEROLOGY | Facility: CLINIC | Age: 71
End: 2021-11-03
Payer: MEDICAID

## 2021-11-03 VITALS
BODY MASS INDEX: 28.82 KG/M2 | WEIGHT: 173 LBS | HEIGHT: 65 IN | SYSTOLIC BLOOD PRESSURE: 123 MMHG | HEART RATE: 51 BPM | DIASTOLIC BLOOD PRESSURE: 74 MMHG

## 2021-11-03 DIAGNOSIS — Z87.39 PERSONAL HISTORY OF OTHER DISEASES OF THE MUSCULOSKELETAL SYSTEM AND CONNECTIVE TISSUE: ICD-10-CM

## 2021-11-03 DIAGNOSIS — Z86.79 PERSONAL HISTORY OF OTHER DISEASES OF THE CIRCULATORY SYSTEM: ICD-10-CM

## 2021-11-03 PROCEDURE — 99214 OFFICE O/P EST MOD 30 MIN: CPT

## 2021-11-04 ENCOUNTER — APPOINTMENT (OUTPATIENT)
Dept: CARDIOLOGY | Facility: CLINIC | Age: 71
End: 2021-11-04
Payer: MEDICAID

## 2021-11-04 PROBLEM — Z86.79 HISTORY OF HYPERTENSION: Status: RESOLVED | Noted: 2021-11-03 | Resolved: 2021-11-04

## 2021-11-04 PROBLEM — Z87.39 HISTORY OF RHEUMATOID ARTHRITIS: Status: RESOLVED | Noted: 2021-11-03 | Resolved: 2021-11-04

## 2021-11-04 NOTE — ASSESSMENT
[FreeTextEntry1] : 71 year old woman with chronic reflux, here for follow up. \par \par She just had endoscopy and has a hernia, no role for repeating the EGD. \par \par Her issue is medication and food non compliance. We went over lifestyle recommendations like avoiding spicy food, decafinated drinks, no late eating, etc. She will take her PPI daily and as directed, half hour before first drink/meal of the day. \par \par She will do this for a month and if symptoms don't juan luis then we will scope her. She states she is up to date on her colon cancer screening.

## 2021-11-04 NOTE — HISTORY OF PRESENT ILLNESS
[de-identified] : 71 year old woman with chronic reflux, here for follow up. \par \par Patient states that she has had reflux for the past several years.  She had an endoscopy to evaluate without any evidence of Cuellar's two years ago, she had a small hiatal hernia. She denies dysphagia or weight loss. Her symptoms are post prandial chest burning after eating spicy foods. She does not take her PPI regularly, and takes it after meals sometimes. She still eats spicy foods and drinks tea that is caffinated often. Symptoms are worse after these meals. She has no other GI complaints like weight loss, abdominal pain. No overt signs of GI bleeding like hematemesis, hematochezia, melena.

## 2021-11-04 NOTE — PHYSICAL EXAM
[General Appearance - Alert] : alert [General Appearance - In No Acute Distress] : in no acute distress [Sclera] : the sclera and conjunctiva were normal [PERRL With Normal Accommodation] : pupils were equal in size, round, and reactive to light [Extraocular Movements] : extraocular movements were intact [Abdomen Soft] : soft [Abnormal Walk] : normal gait [Nail Clubbing] : no clubbing  or cyanosis of the fingernails [Musculoskeletal - Swelling] : no joint swelling seen [Motor Tone] : muscle strength and tone were normal [Skin Color & Pigmentation] : normal skin color and pigmentation [Skin Turgor] : normal skin turgor [] : no rash [Motor Exam] : the motor exam was normal [No Focal Deficits] : no focal deficits [Oriented To Time, Place, And Person] : oriented to person, place, and time [Impaired Insight] : insight and judgment were intact [Affect] : the affect was normal

## 2021-11-13 ENCOUNTER — EMERGENCY (EMERGENCY)
Facility: HOSPITAL | Age: 71
LOS: 1 days | Discharge: HOME | End: 2021-11-13
Attending: EMERGENCY MEDICINE | Admitting: EMERGENCY MEDICINE
Payer: MEDICAID

## 2021-11-13 VITALS
OXYGEN SATURATION: 99 % | TEMPERATURE: 98 F | SYSTOLIC BLOOD PRESSURE: 128 MMHG | HEART RATE: 79 BPM | DIASTOLIC BLOOD PRESSURE: 77 MMHG | RESPIRATION RATE: 16 BRPM

## 2021-11-13 VITALS
SYSTOLIC BLOOD PRESSURE: 139 MMHG | DIASTOLIC BLOOD PRESSURE: 75 MMHG | OXYGEN SATURATION: 96 % | HEIGHT: 65 IN | HEART RATE: 85 BPM | WEIGHT: 160.94 LBS | TEMPERATURE: 102 F | RESPIRATION RATE: 18 BRPM

## 2021-11-13 DIAGNOSIS — H25.9 UNSPECIFIED AGE-RELATED CATARACT: Chronic | ICD-10-CM

## 2021-11-13 DIAGNOSIS — Z98.890 OTHER SPECIFIED POSTPROCEDURAL STATES: Chronic | ICD-10-CM

## 2021-11-13 LAB
ALBUMIN SERPL ELPH-MCNC: 2.9 G/DL — LOW (ref 3.3–5)
ALP SERPL-CCNC: 99 U/L — SIGNIFICANT CHANGE UP (ref 40–120)
ALT FLD-CCNC: 18 U/L — SIGNIFICANT CHANGE UP (ref 12–78)
ANION GAP SERPL CALC-SCNC: 8 MMOL/L — SIGNIFICANT CHANGE UP (ref 5–17)
APPEARANCE UR: CLEAR — SIGNIFICANT CHANGE UP
AST SERPL-CCNC: 23 U/L — SIGNIFICANT CHANGE UP (ref 15–37)
BASOPHILS # BLD AUTO: 0.08 K/UL — SIGNIFICANT CHANGE UP (ref 0–0.2)
BASOPHILS NFR BLD AUTO: 1 % — SIGNIFICANT CHANGE UP (ref 0–2)
BILIRUB SERPL-MCNC: 0.6 MG/DL — SIGNIFICANT CHANGE UP (ref 0.2–1.2)
BILIRUB UR-MCNC: NEGATIVE — SIGNIFICANT CHANGE UP
BUN SERPL-MCNC: 16 MG/DL — SIGNIFICANT CHANGE UP (ref 7–23)
CALCIUM SERPL-MCNC: 8.9 MG/DL — SIGNIFICANT CHANGE UP (ref 8.5–10.1)
CHLORIDE SERPL-SCNC: 105 MMOL/L — SIGNIFICANT CHANGE UP (ref 96–108)
CO2 SERPL-SCNC: 23 MMOL/L — SIGNIFICANT CHANGE UP (ref 22–31)
COLOR SPEC: YELLOW — SIGNIFICANT CHANGE UP
CREAT SERPL-MCNC: 0.83 MG/DL — SIGNIFICANT CHANGE UP (ref 0.5–1.3)
DIFF PNL FLD: ABNORMAL
EOSINOPHIL # BLD AUTO: 0.58 K/UL — HIGH (ref 0–0.5)
EOSINOPHIL NFR BLD AUTO: 7.3 % — HIGH (ref 0–6)
GLUCOSE SERPL-MCNC: 101 MG/DL — HIGH (ref 70–99)
GLUCOSE UR QL: NEGATIVE — SIGNIFICANT CHANGE UP
HCT VFR BLD CALC: 30.7 % — LOW (ref 34.5–45)
HGB BLD-MCNC: 10.1 G/DL — LOW (ref 11.5–15.5)
IMM GRANULOCYTES NFR BLD AUTO: 0.6 % — SIGNIFICANT CHANGE UP (ref 0–1.5)
KETONES UR-MCNC: NEGATIVE — SIGNIFICANT CHANGE UP
LACTATE SERPL-SCNC: 2.1 MMOL/L — HIGH (ref 0.7–2)
LEUKOCYTE ESTERASE UR-ACNC: ABNORMAL
LYMPHOCYTES # BLD AUTO: 0.77 K/UL — LOW (ref 1–3.3)
LYMPHOCYTES # BLD AUTO: 9.7 % — LOW (ref 13–44)
MCHC RBC-ENTMCNC: 32.9 GM/DL — SIGNIFICANT CHANGE UP (ref 32–36)
MCHC RBC-ENTMCNC: 33.6 PG — SIGNIFICANT CHANGE UP (ref 27–34)
MCV RBC AUTO: 102 FL — HIGH (ref 80–100)
MONOCYTES # BLD AUTO: 1.1 K/UL — HIGH (ref 0–0.9)
MONOCYTES NFR BLD AUTO: 13.9 % — SIGNIFICANT CHANGE UP (ref 2–14)
NEUTROPHILS # BLD AUTO: 5.33 K/UL — SIGNIFICANT CHANGE UP (ref 1.8–7.4)
NEUTROPHILS NFR BLD AUTO: 67.5 % — SIGNIFICANT CHANGE UP (ref 43–77)
NITRITE UR-MCNC: NEGATIVE — SIGNIFICANT CHANGE UP
NRBC # BLD: 0 /100 WBCS — SIGNIFICANT CHANGE UP (ref 0–0)
PH UR: 6 — SIGNIFICANT CHANGE UP (ref 5–8)
PLATELET # BLD AUTO: 252 K/UL — SIGNIFICANT CHANGE UP (ref 150–400)
POTASSIUM SERPL-MCNC: 4.3 MMOL/L — SIGNIFICANT CHANGE UP (ref 3.5–5.3)
POTASSIUM SERPL-SCNC: 4.3 MMOL/L — SIGNIFICANT CHANGE UP (ref 3.5–5.3)
PROT SERPL-MCNC: 7.6 G/DL — SIGNIFICANT CHANGE UP (ref 6–8.3)
PROT UR-MCNC: NEGATIVE — SIGNIFICANT CHANGE UP
RAPID RVP RESULT: SIGNIFICANT CHANGE UP
RBC # BLD: 3.01 M/UL — LOW (ref 3.8–5.2)
RBC # FLD: 16.1 % — HIGH (ref 10.3–14.5)
SARS-COV-2 RNA SPEC QL NAA+PROBE: SIGNIFICANT CHANGE UP
SODIUM SERPL-SCNC: 136 MMOL/L — SIGNIFICANT CHANGE UP (ref 135–145)
SP GR SPEC: 1 — LOW (ref 1.01–1.02)
UROBILINOGEN FLD QL: NEGATIVE — SIGNIFICANT CHANGE UP
WBC # BLD: 7.91 K/UL — SIGNIFICANT CHANGE UP (ref 3.8–10.5)
WBC # FLD AUTO: 7.91 K/UL — SIGNIFICANT CHANGE UP (ref 3.8–10.5)

## 2021-11-13 PROCEDURE — 71045 X-RAY EXAM CHEST 1 VIEW: CPT

## 2021-11-13 PROCEDURE — 81001 URINALYSIS AUTO W/SCOPE: CPT

## 2021-11-13 PROCEDURE — 99285 EMERGENCY DEPT VISIT HI MDM: CPT

## 2021-11-13 PROCEDURE — 36415 COLL VENOUS BLD VENIPUNCTURE: CPT

## 2021-11-13 PROCEDURE — 87086 URINE CULTURE/COLONY COUNT: CPT

## 2021-11-13 PROCEDURE — 85025 COMPLETE CBC W/AUTO DIFF WBC: CPT

## 2021-11-13 PROCEDURE — 71045 X-RAY EXAM CHEST 1 VIEW: CPT | Mod: 26

## 2021-11-13 PROCEDURE — 99284 EMERGENCY DEPT VISIT MOD MDM: CPT | Mod: 25

## 2021-11-13 PROCEDURE — 96374 THER/PROPH/DIAG INJ IV PUSH: CPT

## 2021-11-13 PROCEDURE — 0225U NFCT DS DNA&RNA 21 SARSCOV2: CPT

## 2021-11-13 PROCEDURE — 83605 ASSAY OF LACTIC ACID: CPT

## 2021-11-13 PROCEDURE — 80053 COMPREHEN METABOLIC PANEL: CPT

## 2021-11-13 PROCEDURE — 87040 BLOOD CULTURE FOR BACTERIA: CPT

## 2021-11-13 RX ORDER — SODIUM CHLORIDE 9 MG/ML
2300 INJECTION INTRAMUSCULAR; INTRAVENOUS; SUBCUTANEOUS ONCE
Refills: 0 | Status: COMPLETED | OUTPATIENT
Start: 2021-11-13 | End: 2021-11-13

## 2021-11-13 RX ORDER — ACETAMINOPHEN 500 MG
1000 TABLET ORAL ONCE
Refills: 0 | Status: COMPLETED | OUTPATIENT
Start: 2021-11-13 | End: 2021-11-13

## 2021-11-13 RX ORDER — CEFUROXIME AXETIL 250 MG
1 TABLET ORAL
Qty: 14 | Refills: 0
Start: 2021-11-13 | End: 2021-11-19

## 2021-11-13 RX ORDER — CEFTRIAXONE 500 MG/1
1000 INJECTION, POWDER, FOR SOLUTION INTRAMUSCULAR; INTRAVENOUS ONCE
Refills: 0 | Status: COMPLETED | OUTPATIENT
Start: 2021-11-13 | End: 2021-11-13

## 2021-11-13 RX ADMIN — Medication 1000 MILLIGRAM(S): at 16:34

## 2021-11-13 RX ADMIN — CEFTRIAXONE 100 MILLIGRAM(S): 500 INJECTION, POWDER, FOR SOLUTION INTRAMUSCULAR; INTRAVENOUS at 19:39

## 2021-11-13 RX ADMIN — SODIUM CHLORIDE 2300 MILLILITER(S): 9 INJECTION INTRAMUSCULAR; INTRAVENOUS; SUBCUTANEOUS at 16:34

## 2021-11-13 NOTE — ED PROVIDER NOTE - ATTENDING CONTRIBUTION TO CARE
71 female presents to ER with daughter c/o tactile fever, generalized weakness, decreased appetite for 3 days, patient alert and oriented, heart and lungs clear, abdomen soft, non tender, no rashes, f/u labs, ua, cultures, iv fluids, tylenol, re-eval.

## 2021-11-13 NOTE — ED PROVIDER NOTE - OBJECTIVE STATEMENT
70 yo female with h/o RA on methotrexate, HTN, PUD presents to the ED c/o fever x 3 days. Associated with generalized weakness. Temp this .7. Denies headache, neck pain/stiffness, chest pain, sob, cough, throat pain, URI sxs, abd pain, N/V/D, urinary sxs, flank pain, UE/LE weakness or paresthesias.     pmd: Dr. Villanueva

## 2021-11-13 NOTE — ED PROVIDER NOTE - PATIENT PORTAL LINK FT
You can access the FollowMyHealth Patient Portal offered by [facility] by registering at the following website: [email or web address]. By joining [employer]’s FollowMyHealth portal, you will also be able to view your health information using other applications (apps) compatible with our system.

## 2021-11-13 NOTE — ED PROVIDER NOTE - CLINICAL SUMMARY MEDICAL DECISION MAKING FREE TEXT BOX
72 yo female with h/o RA on methotrexate, HTN, PUD presents to the ED c/o fever x 3 days. Associated with generalized weakness. Temp this .7. Denies headache, neck pain/stiffness, chest pain, sob, cough, throat pain, URI sxs, abd pain, N/V/D, urinary sxs, flank pain, UE/LE weakness or paresthesias. PE: as above. A/P: sepsis workup, fluids, tylenol, reassess.

## 2021-11-13 NOTE — ED PROVIDER NOTE - PROGRESS NOTE DETAILS
patient feeling well, non toxic appearing, fever improved, vitals stable, spoke with daughter, labs, chest xray, rvp and ua discussed, patient has appointment to see PMD on Wednesday, to [place] home with ceftin

## 2021-11-13 NOTE — ED PROVIDER NOTE - NSFOLLOWUPINSTRUCTIONS_ED_ALL_ED_FT
Follow up with your primary care doctor as scheduled on Wednesday. Take ceftin as directed.        WHAT YOU NEED TO KNOW:    A urinary tract infection (UTI) is caused by bacteria that get inside your urinary tract. Your urinary tract includes your kidneys, ureters, bladder, and urethra. Urine is made in your kidneys, and it flows from the ureters to the bladder. Urine leaves the bladder through the urethra. A UTI is more common in your lower urinary tract, which includes your bladder and urethra.    Kidney, Ureters, Bladder         DISCHARGE INSTRUCTIONS:    Return to the emergency department if:   •You are urinating very little or not at all.      •You are vomiting.      •You have a high fever with shaking chills.       •You have side or back pain that gets worse.      Call your doctor if:   •You have a fever.      •You are a woman and you have increased white or yellow discharge from your vagina.      •You do not feel better after 2 days of taking antibiotics.      •You have questions or concerns about your condition or care.      Medicines:   •Medicines help treat the bacterial infection or decrease pain and burning when you urinate. You may also need medicines to decrease the urge to urinate often. If you have UTIs often (called recurrent UTIs), you may be given antibiotics to take regularly. You will be given directions for when and how to use antibiotics. The goal is to prevent UTIs but not cause antibiotic resistance by using antibiotics too often.      •Take your medicine as directed. Contact your healthcare provider if you think your medicine is not helping or if you have side effects. Tell him or her if you are allergic to any medicine. Keep a list of the medicines, vitamins, and herbs you take. Include the amounts, and when and why you take them. Bring the list or the pill bottles to follow-up visits. Carry your medicine list with you in case of an emergency.      Self-care:   •Drink liquids as directed. Liquids can help flush bacteria from your urinary tract. Ask how much liquid to drink each day and which liquids are best for you. You may need to drink more liquids than usual to help flush out the bacteria. Do not drink alcohol, caffeine, and citrus juices. These can irritate your bladder and increase your symptoms.      •Apply heat on your abdomen for 20 to 30 minutes every 2 hours for as many days as directed. Heat helps decrease discomfort and pressure in your bladder.      Prevent a UTI:   •Urinate when you feel the urge. Do not hold your urine. Bacteria can grow if urine stays in the bladder too long. It may be helpful to urinate at least every 3 to 4 hours.      •Urinate after you have sex to flush away bacteria that can enter your urinary tract during sex.      •Wear cotton underwear and clothes that are loose. Tight pants and nylon underwear can trap moisture and cause bacteria to grow.      •Cranberry juice or cranberry supplements may help prevent UTIs. Your healthcare provider can recommend the right juice or supplement for you.      •Women should wipe front to back after urinating or having a bowel movement. This may prevent germs from getting into the urinary tract. Do not douche or use feminine deodorants. These can change the chemical balance in your vagina. You may also be given vaginal estrogen medicine. This medicine helps prevent recurrent UTIs in women who have gone through menopause or are in mary-menopause.      Follow up with your doctor as directed: Write down your questions so you remember to ask them during your visits.        © Copyright [employer] 2021           back to top                          © Copyright [employer] 2021

## 2021-11-15 LAB
CULTURE RESULTS: SIGNIFICANT CHANGE UP
SPECIMEN SOURCE: SIGNIFICANT CHANGE UP

## 2021-11-22 ENCOUNTER — EMERGENCY (EMERGENCY)
Facility: HOSPITAL | Age: 71
LOS: 1 days | End: 2021-11-22
Attending: EMERGENCY MEDICINE | Admitting: STUDENT IN AN ORGANIZED HEALTH CARE EDUCATION/TRAINING PROGRAM
Payer: MEDICAID

## 2021-11-22 VITALS
TEMPERATURE: 98 F | OXYGEN SATURATION: 99 % | DIASTOLIC BLOOD PRESSURE: 79 MMHG | RESPIRATION RATE: 16 BRPM | HEIGHT: 65 IN | WEIGHT: 173.06 LBS | HEART RATE: 88 BPM | SYSTOLIC BLOOD PRESSURE: 139 MMHG

## 2021-11-22 DIAGNOSIS — M06.9 RHEUMATOID ARTHRITIS, UNSPECIFIED: ICD-10-CM

## 2021-11-22 DIAGNOSIS — Z29.9 ENCOUNTER FOR PROPHYLACTIC MEASURES, UNSPECIFIED: ICD-10-CM

## 2021-11-22 DIAGNOSIS — Z98.890 OTHER SPECIFIED POSTPROCEDURAL STATES: Chronic | ICD-10-CM

## 2021-11-22 DIAGNOSIS — H25.9 UNSPECIFIED AGE-RELATED CATARACT: Chronic | ICD-10-CM

## 2021-11-22 DIAGNOSIS — I10 ESSENTIAL (PRIMARY) HYPERTENSION: ICD-10-CM

## 2021-11-22 DIAGNOSIS — R50.9 FEVER, UNSPECIFIED: ICD-10-CM

## 2021-11-22 DIAGNOSIS — D64.9 ANEMIA, UNSPECIFIED: ICD-10-CM

## 2021-11-22 LAB
ALBUMIN SERPL ELPH-MCNC: 2.1 G/DL — LOW (ref 3.3–5)
ALP SERPL-CCNC: 95 U/L — SIGNIFICANT CHANGE UP (ref 40–120)
ALT FLD-CCNC: 21 U/L — SIGNIFICANT CHANGE UP (ref 12–78)
ANION GAP SERPL CALC-SCNC: 10 MMOL/L — SIGNIFICANT CHANGE UP (ref 5–17)
APPEARANCE UR: CLEAR — SIGNIFICANT CHANGE UP
APTT BLD: 33.7 SEC — SIGNIFICANT CHANGE UP (ref 27.5–35.5)
AST SERPL-CCNC: 33 U/L — SIGNIFICANT CHANGE UP (ref 15–37)
BASOPHILS # BLD AUTO: 0.1 K/UL — SIGNIFICANT CHANGE UP (ref 0–0.2)
BASOPHILS NFR BLD AUTO: 1.4 % — SIGNIFICANT CHANGE UP (ref 0–2)
BILIRUB SERPL-MCNC: 0.4 MG/DL — SIGNIFICANT CHANGE UP (ref 0.2–1.2)
BILIRUB UR-MCNC: NEGATIVE — SIGNIFICANT CHANGE UP
BUN SERPL-MCNC: 12 MG/DL — SIGNIFICANT CHANGE UP (ref 7–23)
CALCIUM SERPL-MCNC: 8.2 MG/DL — LOW (ref 8.5–10.1)
CHLORIDE SERPL-SCNC: 108 MMOL/L — SIGNIFICANT CHANGE UP (ref 96–108)
CO2 SERPL-SCNC: 19 MMOL/L — LOW (ref 22–31)
COLOR SPEC: SIGNIFICANT CHANGE UP
CREAT SERPL-MCNC: 0.86 MG/DL — SIGNIFICANT CHANGE UP (ref 0.5–1.3)
DIFF PNL FLD: NEGATIVE — SIGNIFICANT CHANGE UP
EOSINOPHIL # BLD AUTO: 0.51 K/UL — HIGH (ref 0–0.5)
EOSINOPHIL NFR BLD AUTO: 7.2 % — HIGH (ref 0–6)
ERYTHROCYTE [SEDIMENTATION RATE] IN BLOOD: 105 MM/HR — HIGH (ref 0–20)
GLUCOSE SERPL-MCNC: 129 MG/DL — HIGH (ref 70–99)
GLUCOSE UR QL: NEGATIVE — SIGNIFICANT CHANGE UP
HADV DNA SPEC QL NAA+PROBE: DETECTED
HCT VFR BLD CALC: 28.8 % — LOW (ref 34.5–45)
HGB BLD-MCNC: 9.1 G/DL — LOW (ref 11.5–15.5)
IMM GRANULOCYTES NFR BLD AUTO: 0.6 % — SIGNIFICANT CHANGE UP (ref 0–1.5)
INR BLD: 1.35 RATIO — HIGH (ref 0.88–1.16)
KETONES UR-MCNC: NEGATIVE — SIGNIFICANT CHANGE UP
LACTATE SERPL-SCNC: 1.7 MMOL/L — SIGNIFICANT CHANGE UP (ref 0.7–2)
LEUKOCYTE ESTERASE UR-ACNC: ABNORMAL
LIDOCAIN IGE QN: 98 U/L — SIGNIFICANT CHANGE UP (ref 73–393)
LYMPHOCYTES # BLD AUTO: 0.45 K/UL — LOW (ref 1–3.3)
LYMPHOCYTES # BLD AUTO: 6.4 % — LOW (ref 13–44)
MCHC RBC-ENTMCNC: 31.6 GM/DL — LOW (ref 32–36)
MCHC RBC-ENTMCNC: 32 PG — SIGNIFICANT CHANGE UP (ref 27–34)
MCV RBC AUTO: 101.4 FL — HIGH (ref 80–100)
MONOCYTES # BLD AUTO: 0.64 K/UL — SIGNIFICANT CHANGE UP (ref 0–0.9)
MONOCYTES NFR BLD AUTO: 9.1 % — SIGNIFICANT CHANGE UP (ref 2–14)
NEUTROPHILS # BLD AUTO: 5.32 K/UL — SIGNIFICANT CHANGE UP (ref 1.8–7.4)
NEUTROPHILS NFR BLD AUTO: 75.3 % — SIGNIFICANT CHANGE UP (ref 43–77)
NITRITE UR-MCNC: NEGATIVE — SIGNIFICANT CHANGE UP
NRBC # BLD: 0 /100 WBCS — SIGNIFICANT CHANGE UP (ref 0–0)
PH UR: 6.5 — SIGNIFICANT CHANGE UP (ref 5–8)
PLATELET # BLD AUTO: 392 K/UL — SIGNIFICANT CHANGE UP (ref 150–400)
POTASSIUM SERPL-MCNC: 3.6 MMOL/L — SIGNIFICANT CHANGE UP (ref 3.5–5.3)
POTASSIUM SERPL-SCNC: 3.6 MMOL/L — SIGNIFICANT CHANGE UP (ref 3.5–5.3)
PROT SERPL-MCNC: 6.8 G/DL — SIGNIFICANT CHANGE UP (ref 6–8.3)
PROT UR-MCNC: NEGATIVE — SIGNIFICANT CHANGE UP
PROTHROM AB SERPL-ACNC: 15.6 SEC — HIGH (ref 10.6–13.6)
RAPID RVP RESULT: DETECTED
RBC # BLD: 2.84 M/UL — LOW (ref 3.8–5.2)
RBC # FLD: 15.7 % — HIGH (ref 10.3–14.5)
SARS-COV-2 RNA SPEC QL NAA+PROBE: SIGNIFICANT CHANGE UP
SODIUM SERPL-SCNC: 137 MMOL/L — SIGNIFICANT CHANGE UP (ref 135–145)
SP GR SPEC: 1 — LOW (ref 1.01–1.02)
TSH SERPL-MCNC: 0.64 UIU/ML — SIGNIFICANT CHANGE UP (ref 0.36–3.74)
UROBILINOGEN FLD QL: NEGATIVE — SIGNIFICANT CHANGE UP
WBC # BLD: 7.06 K/UL — SIGNIFICANT CHANGE UP (ref 3.8–10.5)
WBC # FLD AUTO: 7.06 K/UL — SIGNIFICANT CHANGE UP (ref 3.8–10.5)

## 2021-11-22 PROCEDURE — 93010 ELECTROCARDIOGRAM REPORT: CPT

## 2021-11-22 PROCEDURE — 74177 CT ABD & PELVIS W/CONTRAST: CPT | Mod: 26,MA

## 2021-11-22 PROCEDURE — 71045 X-RAY EXAM CHEST 1 VIEW: CPT | Mod: 26

## 2021-11-22 PROCEDURE — 99285 EMERGENCY DEPT VISIT HI MDM: CPT

## 2021-11-22 PROCEDURE — 99283 EMERGENCY DEPT VISIT LOW MDM: CPT

## 2021-11-22 RX ORDER — LISINOPRIL 2.5 MG/1
20 TABLET ORAL DAILY
Refills: 0 | Status: DISCONTINUED | OUTPATIENT
Start: 2021-11-22 | End: 2021-11-24

## 2021-11-22 RX ORDER — LEFLUNOMIDE 10 MG/1
1 TABLET ORAL
Qty: 0 | Refills: 0 | DISCHARGE

## 2021-11-22 RX ORDER — LEFLUNOMIDE 10 MG/1
20 TABLET ORAL DAILY
Refills: 0 | Status: DISCONTINUED | OUTPATIENT
Start: 2021-11-22 | End: 2021-11-25

## 2021-11-22 RX ORDER — PREDNISOLONE 5 MG
2.5 TABLET ORAL
Qty: 0 | Refills: 0 | DISCHARGE

## 2021-11-22 RX ORDER — SODIUM CHLORIDE 9 MG/ML
2400 INJECTION INTRAMUSCULAR; INTRAVENOUS; SUBCUTANEOUS ONCE
Refills: 0 | Status: COMPLETED | OUTPATIENT
Start: 2021-11-22 | End: 2021-11-22

## 2021-11-22 RX ORDER — FOLIC ACID 0.8 MG
1 TABLET ORAL DAILY
Refills: 0 | Status: DISCONTINUED | OUTPATIENT
Start: 2021-11-22 | End: 2021-11-25

## 2021-11-22 RX ORDER — METOPROLOL TARTRATE 50 MG
50 TABLET ORAL DAILY
Refills: 0 | Status: DISCONTINUED | OUTPATIENT
Start: 2021-11-22 | End: 2021-11-25

## 2021-11-22 RX ORDER — ACETAMINOPHEN 500 MG
650 TABLET ORAL EVERY 6 HOURS
Refills: 0 | Status: DISCONTINUED | OUTPATIENT
Start: 2021-11-22 | End: 2021-11-25

## 2021-11-22 RX ORDER — FERROUS SULFATE 325(65) MG
325 TABLET ORAL DAILY
Refills: 0 | Status: DISCONTINUED | OUTPATIENT
Start: 2021-11-22 | End: 2021-11-25

## 2021-11-22 RX ORDER — PANTOPRAZOLE SODIUM 20 MG/1
40 TABLET, DELAYED RELEASE ORAL
Refills: 0 | Status: DISCONTINUED | OUTPATIENT
Start: 2021-11-22 | End: 2021-11-25

## 2021-11-22 RX ORDER — PREGABALIN 225 MG/1
1000 CAPSULE ORAL DAILY
Refills: 0 | Status: DISCONTINUED | OUTPATIENT
Start: 2021-11-22 | End: 2021-11-25

## 2021-11-22 RX ADMIN — SODIUM CHLORIDE 2400 MILLILITER(S): 9 INJECTION INTRAMUSCULAR; INTRAVENOUS; SUBCUTANEOUS at 12:29

## 2021-11-22 RX ADMIN — SODIUM CHLORIDE 2400 MILLILITER(S): 9 INJECTION INTRAMUSCULAR; INTRAVENOUS; SUBCUTANEOUS at 17:30

## 2021-11-22 RX ADMIN — Medication 650 MILLIGRAM(S): at 19:36

## 2021-11-22 RX ADMIN — LISINOPRIL 20 MILLIGRAM(S): 2.5 TABLET ORAL at 22:51

## 2021-11-22 NOTE — H&P ADULT - NSHPPHYSICALEXAM_GEN_ALL_CORE
Odomzo Counseling- I discussed with the patient the risks of Odomzo including but not limited to nausea, vomiting, diarrhea, constipation, weight loss, changes in the sense of taste, decreased appetite, muscle spasms, and hair loss.  The patient verbalized understanding of the proper use and possible adverse effects of Odomzo.  All of the patient's questions and concerns were addressed. see attending attestation

## 2021-11-22 NOTE — ED PROVIDER NOTE - OBJECTIVE STATEMENT
70 y/o female with PMHx HTN, HLD, and RA presents today due to fever. pt reports her temperature was 99.9F today. pt reports she took Paracetamol this morning at 8am. pt reports presented to ED 11/13 in which she was diagnosed with UTI and prescribed Ceftin. pt reports she developed vomiting and diarrhea after starting Ceftin in which her PCP switched her abx to Cipro starting 11/17. pt reports last episode of vomiting with 11/17. pt reports soft stool this morning. pt reports feeling lightheaded when she stands from a seated position. pt admits to mild SOB and cough. pt denies cp, vertigo, headache, rash, flank pain, dysuria, hematuria, or any other complaints.

## 2021-11-22 NOTE — H&P ADULT - PROBLEM SELECTOR PLAN 5
Lovenox 40mg qd    IMPROVE VTE Individual Risk Assessment          RISK                                                          Points  [  ] Previous VTE                                                3  [  ] Thrombophilia                                             2  [  ] Lower limb paralysis                                   2        (unable to hold up >15 seconds)    [  ] Current Cancer                                             2         (within 6 months)  [  ] Immobilization > 24 hrs                              1  [  ] ICU/CCU stay > 24 hours                             1  [ x ] Age > 60                                                         1    IMPROVE VTE Score: 1 Will monitor off chemoppx pending FOBT results. SCDs for now    IMPROVE VTE Individual Risk Assessment          RISK                                                          Points  [  ] Previous VTE                                                3  [  ] Thrombophilia                                             2  [  ] Lower limb paralysis                                   2        (unable to hold up >15 seconds)    [  ] Current Cancer                                             2         (within 6 months)  [  ] Immobilization > 24 hrs                              1  [  ] ICU/CCU stay > 24 hours                             1  [ x ] Age > 60                                                         1    IMPROVE VTE Score: 1

## 2021-11-22 NOTE — H&P ADULT - NSHPSOCIALHISTORY_GEN_ALL_CORE
Lives with two daughters  Grew up in [place] and moved to the [place] in [place].  Independent with ADLs: walks independently; cooks independently; bathes self independently.  Former smoker: quit 40 years ago; smoked 1 ppd x 1-2 years.  Drinks 1 alcoholic drink per week.  Denies drug use

## 2021-11-22 NOTE — H&P ADULT - PROBLEM SELECTOR PLAN 2
-H/H 9.1/28, appears near baseline per chart review  -S/p EGD during admission 7/2019 which showed small non-bleeding linear ulcers at GE junction  -Last colonoscopy 3 years ago reportedly WNL  -Continue PPI  -No active bleeding at this time  -Monitor CBC  -F/u iron studies -H/H 9.1/28, Hb appears to be frequently at this level based on prior records, but patient also has HB in 11-12 range sporadically  -S/p EGD during admission on 7/2019 for concern of  which showed small non-bleeding linear ulcers at GE junction  -Last colonoscopy 3 years ago reportedly WNL  -Continue PPI  -No active bleeding at this time  -Monitor CBC  -F/u iron studies  -F/u FOBT

## 2021-11-22 NOTE — H&P ADULT - PROBLEM SELECTOR PLAN 4
-Continue methotrexate and leflunomide -Continue leflunomide  -Also on methotrexate, last dose on Sunday

## 2021-11-22 NOTE — H&P ADULT - ATTENDING COMMENTS
Ms Bingham is a 70 yo F presenting from home with fever for the past 2 weeks. PMH Rheumatoid arthritis, HTN, GIB. Anemia, hx of E. coli septicemia.     Patient seen and examined at bedside. She reports that she has been having fevers almost daily for the past 2 weeks ranging from 100F to 102F orally. She has been taking paracetamol for the fever. She admits to SOB at rest and exertion for the last 15 days, no associated chest pain or palpitations. She travelled to [facility] in March/April 2021. Denies any diarrhea or chills. She had COVID-19 infection earlier this year. She is fully vaccinated from covid-19 she states. Denies any associated rashes. Her last colonoscopy was 3 years ago and reportedly normal. Last EGD was 2 years ago.   CONSTITUTIONAL: admits fever, fatigue, weakness. No chills  HEENT: denies blurred vision, sore throat  SKIN: denies new lesions, rash  CARDIOVASCULAR: denies chest pain, chest pressure, palpitations  RESPIRATORY: admits shortness of breath, denies cough, sputum production  GASTROINTESTINAL: denies nausea, vomiting, diarrhea, abdominal pain, melena, hematochezia   GENITOURINARY: denies dysuria, discharge [currently taking Cipro day 5/5]  NEUROLOGICAL: denies numbness, headache, focal weakness  MUSCULOSKELETAL: denies new joint pain, muscle aches  HEMATOLOGIC: denies gross bleeding, bruising  LYMPHATICS: denies enlarged lymph nodes, extremity swelling    Plan:   Fever: unknown etiology, blood cultures from previous ER visit 11/13 shows no growth x 2 sets. urine culture likely contaminants.   -f/u repeat cultures.   -fever could be related to her rheumatoid arthritis.   -was in [place] approximately 6 months ago- ID consulted consider infection related parasitic infections.   -trend WBC count and monitor for fevers.   -CT abd/pelvis   -f/u RVP.  -may need CTA chest but already received contrast today. check D-dimer.     -remainder as above. Ms Bingham is a 70 yo F presenting from home with fever for the past 2 weeks. PMH Rheumatoid arthritis, HTN, GIB. Anemia, hx of E. coli septicemia.     Patient seen and examined at bedside. She reports that she has been having fevers almost daily for the past 2 weeks ranging from 100F to 102F orally. She has been taking paracetamol for the fever. She admits to SOB at rest and exertion for the last 15 days, no associated chest pain or palpitations. She travelled to [facility] in March/April 2021. Denies any diarrhea or chills. She had COVID-19 infection earlier this year. She is fully vaccinated from covid-19 she states. Denies any associated rashes. Her last colonoscopy was 3 years ago and reportedly normal. Last EGD was 2 years ago.   CONSTITUTIONAL: admits fever, fatigue, weakness. No chills  HEENT: denies blurred vision, sore throat  SKIN: denies new lesions, rash  CARDIOVASCULAR: denies chest pain, chest pressure, palpitations  RESPIRATORY: admits shortness of breath, denies cough, sputum production  GASTROINTESTINAL: denies nausea, vomiting, diarrhea, abdominal pain, melena, hematochezia   GENITOURINARY: denies dysuria, discharge [currently taking Cipro day 5/5]  NEUROLOGICAL: denies numbness, headache, focal weakness  MUSCULOSKELETAL: denies new joint pain, muscle aches  HEMATOLOGIC: denies gross bleeding, bruising  LYMPHATICS: denies enlarged lymph nodes, extremity swelling    T(C): 38 (11-22-21 @ 20:08), Max: 38 (11-22-21 @ 20:08)  HR: 96 (11-22-21 @ 20:08) (83 - 96)  BP: 142/64 (11-22-21 @ 20:08) (139/79 - 159/71)  RR: 19 (11-22-21 @ 20:08) (16 - 19)  SpO2: 98% (11-22-21 @ 20:08) (98% - 99%)  Wt(kg): --    Physical Exam:   GENERAL: well-groomed, well-developed, NAD  HEENT: head NC/AT; EOM intact, conjunctiva & sclera clear; hearing grossly intact, moist mucous membranes  NECK: supple, no JVD  RESPIRATORY: CTA B/L, no wheezing, rales, rhonchi or rubs  CARDIOVASCULAR: S1&S2, RRR, no murmurs or gallops  ABDOMEN: soft, non-tender, non-distended, + Bowel sounds x4 quadrants, no guarding, rebound or rigidity  MUSCULOSKELETAL:  no clubbing, cyanosis or edema of all 4 extremities  LYMPH: no cervical lymphadenopathy  VASCULAR: Radial pulses 2+ bilaterally, no varicose veins   SKIN: warm and dry, color normal  NEUROLOGIC: AA&O X3, CN2-12 grossly intact w/ no focal deficits, no sensory loss, motor Strength 5/5 in UE & LE B/L  Psych: Normal mood and affect, normal behavior          Plan:   Fever: unknown etiology, blood cultures from previous ER visit 11/13 shows no growth x 2 sets. urine culture likely contaminants.   -f/u repeat cultures.   -fever could be related to her rheumatoid arthritis.   -was in [place] approximately 6 months ago- ID consulted consider infection related parasitic infections.   -trend WBC count and monitor for fevers.   -CT abd/pelvis   -f/u RVP.  -may need CTA chest but already received contrast today. check D-dimer.     Anemia: check FOBT, has past hx of GIB in the past.   -check iron studies, b12, folate.     -remainder as above.

## 2021-11-22 NOTE — H&P ADULT - NSHPOUTPATIENTPROVIDERS_GEN_ALL_CORE
Rheumatologist: Dr Stefan Larios Rheumatologist: Dr Stefan Larios  PCP: Dr. Mandel Rheumatologist: Dr Stefan Larios  PCP: Dr. Magi Villanueva

## 2021-11-22 NOTE — ED ADULT NURSE NOTE - CHIEF COMPLAINT QUOTE
Patient is a 70yo female complaining of fever nausea and vomiting x 8 days patient was [place] [facility] [place] ago and was diagnosed with a UTI Patient has continued to have fever and nausea and vomiting

## 2021-11-22 NOTE — ED ADULT TRIAGE NOTE - WILL THE PATIENT ACCEPT THE PFIZER COVID-19 VACCINE IF ELIGIBLE AND IT IS AVAILABLE?
If provider orders tests at today's visit, patient would like to be contacted via Either Telephone OR [facility].  If to contact patient by phone, patient's preferred phone # is [phone] (Cell) and it is OK to leave message on voice mail or with family member.  If medications are ordered at today's visit, the pharmacy name/location patient would like them to be sent to is   [facility] - [address] [address]  Phone: [phone] Fax: [phone]       No

## 2021-11-22 NOTE — H&P ADULT - NSHPREVIEWOFSYSTEMS_GEN_ALL_CORE
CONSTITUTIONAL: admits fever, denies chills, fatigue, weakness  HEENT: denies blurred vision, sore throat  SKIN: denies new lesions, rash  CARDIOVASCULAR: denies chest pain, chest pressure, palpitations  RESPIRATORY: admits shortness of breath and cough  GASTROINTESTINAL: denies nausea, vomiting, diarrhea, abdominal pain  GENITOURINARY: denies dysuria, discharge  NEUROLOGICAL: denies numbness, headache, focal weakness  MUSCULOSKELETAL: denies new joint pain, muscle aches  HEMATOLOGIC: denies gross bleeding, bruising CONSTITUTIONAL: admits fever, denies chills, fatigue, weakness  HEENT: denies blurred vision, sore throat  SKIN: denies new lesions, rash  CARDIOVASCULAR: denies chest pain, chest pressure, palpitations  RESPIRATORY: admits shortness of breath and cough  GASTROINTESTINAL: denies nausea, vomiting, diarrhea, abdominal pain, melena, hematochezia [admits dark stools but attributes to iron supplement in the past]  GENITOURINARY: denies dysuria, discharge  NEUROLOGICAL: denies numbness, headache, focal weakness  MUSCULOSKELETAL: denies new joint pain, muscle aches  HEMATOLOGIC: denies gross bleeding, bruising

## 2021-11-22 NOTE — ED PROVIDER NOTE - PHYSICAL EXAMINATION

## 2021-11-22 NOTE — ED PROVIDER NOTE - ATTENDING CONTRIBUTION TO CARE
71 female presents to ER c/o body aches, fever tamx 102.F, chills, generalized weakness, decreased appetite, for past 1.5 to 2 weeks, patient had come to ER 11/13/21 for same, had labs, cultures, RVP, presumed to have UTI and dc home with ceftin, states she took it for a few days and was unable to tolerate it, and her PCP switched it to cipro, states she is still having fevers, patient alert and oriented, heart and lungs clear, abdomen soft, f/u ct abdomen/pelvis, for source of infections, re-send labs and cultures, iv fluids, orthostatics.

## 2021-11-22 NOTE — ED ADULT NURSE REASSESSMENT NOTE - NSIMPLEMENTINTERV_GEN_ALL_ED
Implemented All Fall Risk Interventions:  [place] to call system. Call bell, personal items and telephone within reach. Instruct patient to call for assistance. Room bathroom lighting operational. Non-slip footwear when patient is off stretcher. Physically safe environment: no spills, clutter or unnecessary equipment. Stretcher in lowest position, wheels locked, appropriate side rails in place. Provide visual cue, wrist band, yellow gown, etc. Monitor gait and stability. Monitor for mental status changes and reorient to person, place, and time. Review medications for side effects contributing to fall risk. Reinforce activity limits and safety measures with patient and family.

## 2021-11-22 NOTE — ED PROVIDER NOTE - CLINICAL SUMMARY MEDICAL DECISION MAKING FREE TEXT BOX
presents today due to fever. pt reports her temperature was 99.9F today. pt reports presented to ED 11/13 in which she was diagnosed with UTI and prescribed Ceftin. pt reports she developed vomiting and diarrhea after starting Ceftin in which her PCP switched her abx to Cipro starting 11/17. pt reports last episode of vomiting with 11/17. pt reports soft stool this morning. pt reports feeling lightheaded, SOB, and cough. plan includes labs, sepsis work up, UCx, CT abd/pelvis, orthostatic vitals, IVF, re-assess

## 2021-11-22 NOTE — ED ADULT TRIAGE NOTE - CHIEF COMPLAINT QUOTE
Patient is a 70yo female complaining of fever nausea and vomiting x 8 days patient was seen at [facility] 8 days ago and was diagnosed with a UTI Patient has continued to have fever and nausea and vomiting

## 2021-11-22 NOTE — H&P ADULT - HISTORY OF PRESENT ILLNESS
72 y/o female with PMHx HTN, HLD, and RA presents today due to fever for the past 2 weeks. States fevers have been occurring daily, Tmax 102F. Took paracetamol this morning at 8am prior to coming to hospital for temperature of 99.9F. States she developed associated SOB and cough for the last few days. Of note, patient was seen in the ED on 11/13 and was diagnosed with UTI and DC'ed on ceftin. She subsequently developed vomiting and diarrhea s/p starting ceftin and was switched to cipro on 11/17 by outpatient PCP (today is her last day of abx). Most recent travel was to [place] in March/April 2021. Patient also s/p covid infection earlier this year, endorses she is fully vaccinate. At this time, patient is in bed, NAD.     In the ED, vitals were temp 97.6F, HR 88, /79, RR 16, O2 99% on RA  Labs were significant for H/H 9.1/28.8, PT 15.6, INR 1.35, glucose 129, calcium 8.2, albumin 2.1  CT abdom/pelvis: negative  S/p 2.4L NS 72 y/o female with PMHx HTN, HLD, and RA presents today due to fever for the past 2 weeks. States fevers have been occurring daily, Tmax 102F. Took paracetamol this morning at 8am prior to coming to hospital for temperature of 99.9F. States she developed associated SOB and cough for the last few days. Of note, patient was seen in the ED on 11/13 and was diagnosed with UTI and DC'ed on ceftin. She subsequently developed vomiting and diarrhea s/p starting ceftin and was switched to cipro on 11/17 by outpatient PCP (today is her last day of abx). Most recent travel was to [facility] in March/April 2021. Patient also s/p covid infection earlier this year, endorses she is fully vaccinate. At this time, patient is in bed, NAD.     In the ED, vitals were temp 97.6F, HR 88, /79, RR 16, O2 99% on RA  Labs were significant for H/H 9.1/28.8, PT 15.6, INR 1.35, glucose 129, calcium 8.2, albumin 2.1  EKG NSR  CT abdom/pelvis: negative  S/p 2.4L NS 72 y/o female with PMHx HTN, HLD, and RA presents today due to fever for the past 2 weeks. States fevers have been occurring daily, Tmax 102F. Took paracetamol this morning at 8am prior to coming to hospital for temperature of 99.9F. States she developed associated SOB and cough for the last few days. Of note, patient was seen in the ED on 11/13 and was diagnosed with UTI and DC'ed on ceftin. She subsequently developed vomiting and diarrhea s/p starting ceftin and was switched to cipro on 11/17 by outpatient PCP (today is her last day of abx). Most recent travel was to [place] in March/April 2021. Patient also s/p covid infection earlier this year, endorses she is fully vaccinated. At this time, patient is in bed, NAD. Denies past hx of PE/DVT/clotting disorder and no family hx of such.     In the ED, vitals were temp 97.6F, HR 88, /79, RR 16, O2 99% on RA  Labs were significant for H/H 9.1/28.8, PT 15.6, INR 1.35, glucose 129, calcium 8.2, albumin 2.1  EKG NSR  CT abdom/pelvis: negative  S/p 2.4L NS

## 2021-11-22 NOTE — H&P ADULT - ASSESSMENT
70 y/o female with PMHx HTN, HLD, and RA presents today due to fever for the past 2 weeks. Admitted for fever of unknown origin.

## 2021-11-23 LAB
ALBUMIN SERPL ELPH-MCNC: 2.1 G/DL — LOW (ref 3.3–5)
ALP SERPL-CCNC: 91 U/L — SIGNIFICANT CHANGE UP (ref 40–120)
ALT FLD-CCNC: 29 U/L — SIGNIFICANT CHANGE UP (ref 12–78)
ANION GAP SERPL CALC-SCNC: 8 MMOL/L — SIGNIFICANT CHANGE UP (ref 5–17)
AST SERPL-CCNC: 54 U/L — HIGH (ref 15–37)
BASOPHILS # BLD AUTO: 0.09 K/UL — SIGNIFICANT CHANGE UP (ref 0–0.2)
BASOPHILS NFR BLD AUTO: 1.3 % — SIGNIFICANT CHANGE UP (ref 0–2)
BILIRUB SERPL-MCNC: 0.4 MG/DL — SIGNIFICANT CHANGE UP (ref 0.2–1.2)
BUN SERPL-MCNC: 9 MG/DL — SIGNIFICANT CHANGE UP (ref 7–23)
CALCIUM SERPL-MCNC: 8.5 MG/DL — SIGNIFICANT CHANGE UP (ref 8.5–10.1)
CHLORIDE SERPL-SCNC: 112 MMOL/L — HIGH (ref 96–108)
CO2 SERPL-SCNC: 20 MMOL/L — LOW (ref 22–31)
CREAT SERPL-MCNC: 0.69 MG/DL — SIGNIFICANT CHANGE UP (ref 0.5–1.3)
CRP SERPL-MCNC: 77 MG/L — HIGH
EOSINOPHIL # BLD AUTO: 0.38 K/UL — SIGNIFICANT CHANGE UP (ref 0–0.5)
EOSINOPHIL NFR BLD AUTO: 5.7 % — SIGNIFICANT CHANGE UP (ref 0–6)
FERRITIN SERPL-MCNC: 426 NG/ML — HIGH (ref 15–150)
FOLATE SERPL-MCNC: 16.9 NG/ML — SIGNIFICANT CHANGE UP
GLUCOSE SERPL-MCNC: 98 MG/DL — SIGNIFICANT CHANGE UP (ref 70–99)
HCT VFR BLD CALC: 28.7 % — LOW (ref 34.5–45)
HGB BLD-MCNC: 9.6 G/DL — LOW (ref 11.5–15.5)
IMM GRANULOCYTES NFR BLD AUTO: 0.9 % — SIGNIFICANT CHANGE UP (ref 0–1.5)
IRON SATN MFR SERPL: 17 % — SIGNIFICANT CHANGE UP (ref 14–50)
IRON SATN MFR SERPL: 42 UG/DL — SIGNIFICANT CHANGE UP (ref 30–160)
LYMPHOCYTES # BLD AUTO: 0.45 K/UL — LOW (ref 1–3.3)
LYMPHOCYTES # BLD AUTO: 6.7 % — LOW (ref 13–44)
MCHC RBC-ENTMCNC: 33.3 PG — SIGNIFICANT CHANGE UP (ref 27–34)
MCHC RBC-ENTMCNC: 33.4 GM/DL — SIGNIFICANT CHANGE UP (ref 32–36)
MCV RBC AUTO: 99.7 FL — SIGNIFICANT CHANGE UP (ref 80–100)
MONOCYTES # BLD AUTO: 0.25 K/UL — SIGNIFICANT CHANGE UP (ref 0–0.9)
MONOCYTES NFR BLD AUTO: 3.7 % — SIGNIFICANT CHANGE UP (ref 2–14)
NEUTROPHILS # BLD AUTO: 5.49 K/UL — SIGNIFICANT CHANGE UP (ref 1.8–7.4)
NEUTROPHILS NFR BLD AUTO: 81.7 % — HIGH (ref 43–77)
NRBC # BLD: 0 /100 WBCS — SIGNIFICANT CHANGE UP (ref 0–0)
NT-PROBNP SERPL-SCNC: 1340 PG/ML — HIGH (ref 0–125)
PLATELET # BLD AUTO: 442 K/UL — HIGH (ref 150–400)
POTASSIUM SERPL-MCNC: 3.9 MMOL/L — SIGNIFICANT CHANGE UP (ref 3.5–5.3)
POTASSIUM SERPL-SCNC: 3.9 MMOL/L — SIGNIFICANT CHANGE UP (ref 3.5–5.3)
PROT SERPL-MCNC: 6.7 G/DL — SIGNIFICANT CHANGE UP (ref 6–8.3)
RBC # BLD: 2.88 M/UL — LOW (ref 3.8–5.2)
RBC # FLD: 15.7 % — HIGH (ref 10.3–14.5)
SODIUM SERPL-SCNC: 140 MMOL/L — SIGNIFICANT CHANGE UP (ref 135–145)
TIBC SERPL-MCNC: 245 UG/DL — SIGNIFICANT CHANGE UP (ref 220–430)
TRANSFERRIN SERPL-MCNC: 195 MG/DL — LOW (ref 200–360)
UIBC SERPL-MCNC: 204 UG/DL — SIGNIFICANT CHANGE UP (ref 110–370)
VIT B12 SERPL-MCNC: 1869 PG/ML — HIGH (ref 232–1245)
WBC # BLD: 6.72 K/UL — SIGNIFICANT CHANGE UP (ref 3.8–10.5)
WBC # FLD AUTO: 6.72 K/UL — SIGNIFICANT CHANGE UP (ref 3.8–10.5)

## 2021-11-23 PROCEDURE — 99217: CPT

## 2021-11-23 RX ADMIN — Medication 200 MILLIGRAM(S): at 21:29

## 2021-11-23 RX ADMIN — Medication 50 MILLIGRAM(S): at 05:30

## 2021-11-23 RX ADMIN — Medication 200 MILLIGRAM(S): at 14:27

## 2021-11-23 RX ADMIN — Medication 325 MILLIGRAM(S): at 11:52

## 2021-11-23 RX ADMIN — PREGABALIN 1000 MICROGRAM(S): 225 CAPSULE ORAL at 11:52

## 2021-11-23 RX ADMIN — LEFLUNOMIDE 20 MILLIGRAM(S): 10 TABLET ORAL at 11:53

## 2021-11-23 RX ADMIN — PANTOPRAZOLE SODIUM 40 MILLIGRAM(S): 20 TABLET, DELAYED RELEASE ORAL at 05:32

## 2021-11-23 RX ADMIN — Medication 1 TABLET(S): at 11:52

## 2021-11-23 RX ADMIN — Medication 1 MILLIGRAM(S): at 11:52

## 2021-11-23 RX ADMIN — LISINOPRIL 20 MILLIGRAM(S): 2.5 TABLET ORAL at 17:44

## 2021-11-23 NOTE — PROGRESS NOTE ADULT - PROBLEM SELECTOR PLAN 5
Will monitor off chemoppx pending FOBT results. SCDs for now    IMPROVE VTE Individual Risk Assessment          RISK                                                          Points  [  ] Previous VTE                                                3  [  ] Thrombophilia                                             2  [  ] Lower limb paralysis                                   2        (unable to hold up >15 seconds)    [  ] Current Cancer                                             2         (within 6 months)  [  ] Immobilization > 24 hrs                              1  [  ] ICU/CCU stay > 24 hours                             1  [ x ] Age > 60                                                         1    IMPROVE VTE Score: 1

## 2021-11-23 NOTE — CONSULT NOTE ADULT - ASSESSMENT
Patient is a 71 year old female with PMH HTN, HLD, and RA who presented with complaint of fever, cough and dyspnea with fever due to unknown origin.  Fever - suspect likely due to viral illness  Bronchitis due to Adenovirus   H/o RA on methotrexate (last dose 11/21), leflunomide, off steroids x5 months   - immunocompromised, may be contributing to prolonged fevers  H/o COVID in March-April 2021 in [place], did not require hospitalization, vaccinated now  Anemia    RVP + for Adenovirus, isolation precautions per infection control  COVID negative. CXR reviewed - no infiltrate/consolidation/cavitations noted  CTAP with small hiatal hernia, no acute pathology  no urinary sx currently, UA negative, recently completed ciprofloxacin course  prior cx  reviewed, - 11/14 urine culture with >3 organisms, likely contaminated, blood cultures negative  Currently afebrile, Tm 100.4F last night. No leukocytosis.     Recommendations:   Pulmonary following, evaluate for possible pulmonary HTN  Ddimer ordered, plan for CTA chest, TTE  follow blood and urine cultures  continue off antibiotics for now  continue supportive care, cough suppressants prn  anemia management  per primary team  monitor temps/WBC  D/w Dr. Black Patient is a 71 year old female with PMH HTN, HLD, and RA who presented with complaint of fever, cough and dyspnea with fever due to unknown origin.  Fever - suspect likely due to viral illness  Bronchitis due to Adenovirus   H/o RA on methotrexate (last dose 11/21), leflunomide, off steroids x5 months   - immunocompromised, may be contributing to prolonged fevers  H/o COVID in March-April 2021 in [facility], did not require hospitalization, dec appetite since COVID infection.   COVID vaccinated  Anemia    RVP + for Adenovirus, isolation precautions per infection control  COVID negative. CXR reviewed - no infiltrate/consolidation/cavitations noted  CTAP with small hiatal hernia, no acute pathology  no urinary sx currently, UA negative, recently completed ciprofloxacin course  prior cx  reviewed, - 11/14 urine culture with >3 organisms, likely contaminated, blood cultures negative  Currently afebrile, Tm 100.4F last night. No leukocytosis.     Recommendations:   Pulmonary following, evaluate for possible pulmonary HTN  Ddimer ordered, plan for CTA chest, TTE  follow blood and urine cultures  continue off antibiotics for now  continue supportive care, cough suppressants prn  anemia management  per primary team  monitor temps/WBC  D/w Dr. Black

## 2021-11-23 NOTE — PROGRESS NOTE ADULT - TIME BILLING
Patient seen and examined at bedside. Meds, labs, vitals, chart reviewed. Plan d/w Patient all questions answered. Also d/w RN, SW/CM

## 2021-11-23 NOTE — CONSULT NOTE ADULT - SUBJECTIVE AND OBJECTIVE BOX
Date/Time Patient Seen:  		  Referring MD:   Data Reviewed	       Patient is a 71y old  Female who presents with a chief complaint of fever of unknown origin (22 Nov 2021 15:40)      Subjective/HPI  in bed  seen and examined  vs noted  labs reviewed  H and P reviewed  ER provider note reviewed  awake  verbal  alert  reports cough  positive for Adenovirus  reports occ PETERSON  pt has extensive med hx  non smoker  non drinker  lives at home  no occupational exposures    History of Present Illness:  Reason for Admission: fever of unknown origin  History of Present Illness:   70 y/o female with PMHx HTN, HLD, and RA presents today due to fever for the past 2 weeks. States fevers have been occurring daily, Tmax 102F. Took paracetamol this morning at 8am prior to coming to hospital for temperature of 99.9F. States she developed associated SOB and cough for the last few days. Of note, patient was seen in the ED on 11/13 and was diagnosed with UTI and DC'ed on ceftin. She subsequently developed vomiting and diarrhea s/p starting ceftin and was switched to cipro on 11/17 by outpatient PCP (today is her last day of abx). Most recent travel was to [place] in March/April 2021. Patient also s/p covid infection earlier this year, endorses she is fully vaccinated. At this time, patient is in bed, NAD. Denies past hx of PE/DVT/clotting disorder and no family hx of such.       FAMILY HISTORY:  Family history of myocardial infarction, Father  FH: throat cancer, Mother.     Social History:  Social History (marital status, living situation, occupation, tobacco use, alcohol and drug use, and sexual history): Lives with two daughters  Grew up in [place] and moved [place] the [place].  Independent with ADLs: walks independently; cooks independently; bathes self independently.  Former smoker: quit 40 years ago; smoked 1 ppd x 1-2 years.  Drinks 1 alcoholic drink per week.  Denies drug use     Tobacco Screening:  · Core Measure Site	Yes  · Has the patient used tobacco in the past 30 days?	No    Risk Assessment:    Present on Admission:  Deep Venous Thrombosis	no  Pulmonary Embolus	no     Heart Failure:  Does this patient have a history of or has been diagnosed with heart failure? no.     PAST MEDICAL & SURGICAL HISTORY:  Rheumatoid arthritis    Hypertension    Anemia    Peptic ulcer    H/O knee surgery  Bilateral    Age related cataract  OU          Medication list         MEDICATIONS  (STANDING):  cyanocobalamin 1000 MICROGram(s) Oral daily  ferrous    sulfate 325 milliGRAM(s) Oral daily  folic acid 1 milliGRAM(s) Oral daily  leflunomide 20 milliGRAM(s) Oral daily  lisinopril 20 milliGRAM(s) Oral daily  metoprolol succinate ER 50 milliGRAM(s) Oral daily  multivitamin 1 Tablet(s) Oral daily  pantoprazole    Tablet 40 milliGRAM(s) Oral before breakfast    MEDICATIONS  (PRN):  acetaminophen     Tablet .. 650 milliGRAM(s) Oral every 6 hours PRN Temp greater or equal to 38C (100.4F)         Vitals log        ICU Vital Signs Last 24 Hrs  T(C): 37.2 (23 Nov 2021 05:25), Max: 38 (22 Nov 2021 20:08)  T(F): 98.9 (23 Nov 2021 05:25), Max: 100.4 (22 Nov 2021 20:08)  HR: 100 (23 Nov 2021 05:25) (83 - 100)  BP: 161/73 (23 Nov 2021 05:25) (124/66 - 161/73)  BP(mean): --  ABP: --  ABP(mean): --  RR: 18 (23 Nov 2021 05:25) (16 - 19)  SpO2: 92% (23 Nov 2021 06:17) (90% - 99%)           Input and Output:  I&O's Detail    22 Nov 2021 07:01  -  23 Nov 2021 07:00  --------------------------------------------------------  IN:    Oral Fluid: 480 mL  Total IN: 480 mL    OUT:  Total OUT: 0 mL    Total NET: 480 mL          Lab Data                        9.1    7.06  )-----------( 392      ( 22 Nov 2021 12:31 )             28.8     11-22    137  |  108  |  12  ----------------------------<  129<H>  3.6   |  19<L>  |  0.86    Ca    8.2<L>      22 Nov 2021 12:31    TPro  6.8  /  Alb  2.1<L>  /  TBili  0.4  /  DBili  x   /  AST  33  /  ALT  21  /  AlkPhos  95  11-22            Review of Systems	  cough  peterson  weakness  fever    Objective     Physical Examination    on RA  heart s1s2  lung dec BS  abd soft  head nc  head at  verbal  cn grossly int      Pertinent Lab findings & Imaging      Puentes:  NO   Adequate UO     I&O's Detail    22 Nov 2021 07:01  -  23 Nov 2021 07:00  --------------------------------------------------------  IN:    Oral Fluid: 480 mL  Total IN: 480 mL    OUT:  Total OUT: 0 mL    Total NET: 480 mL               Discussed with:     Cultures:	        Radiology      EXAM:  CT ABDOMEN AND PELVIS IC                            PROCEDURE DATE:  11/22/2021          INTERPRETATION:  CLINICAL INFORMATION: Fever diarrhea vomiting    COMPARISON: 7/19/2019.    CONTRAST/COMPLICATIONS:  IV Contrast: Omnipaque 350  90 cc administered   10 cc discarded  Oral Contrast: NONE  Complications: None reported at time of study completion    PROCEDURE:  CT of the Abdomen and Pelvis was performed.  Sagittal and coronal reformats were performed.    FINDINGS:  LOWER CHEST: Small hiatal hernia.    LIVER: Within normal limits.  BILE DUCTS: Normal caliber.  GALLBLADDER: Within normal limits.  SPLEEN: Within normal limits.  PANCREAS: Within normal limits.  ADRENALS: Within normal limits.  KIDNEYS/URETERS: Within normal limits.    BLADDER: Within normal limits.  REPRODUCTIVE ORGANS: No gynecologic mass    BOWEL: No bowel obstruction or inflammation. Appendix normal  PERITONEUM: No ascites.  VESSELS: Nonaneurysmal  RETROPERITONEUM/LYMPH NODES: No lymphadenopathy.  ABDOMINAL WALL: Within normal limits.  BONES: Stable T12 vertebral body hemangioma.    IMPRESSION:  No acute findings.        --- End of Report ---            SHIRA COLLADO MD; Attending Radiologist  This document has been electronically signed. Nov 22 2021  2:22PM                        
[facility], Division of Infectious Diseases  LUCILA Fernandez S. Shah, Y. Patel, G. [facility]  [phone]  ([phone] - weekdays after 5pm and weekends)    NITHIN TRUONG  71y, Female  [id number]    HPI:  Patient is a 71 year old female with PMH HTN, HLD, and RA who presented with complaint of fever. Patient reports she has been having daily fevers with chills with Tm 102F over the last 2 weeks and has been having a dry cough for the past 8-9 days. She reports having some dyspnea with cough, had been taking cough suppressants at home which helped. She had taken paracetamol yesterday morning at 8am prior to coming to hospital for temperature of 99.9F. Patient was seen in the ED on  and was diagnosed with UTI and discharge on ceftin. She subsequently developed vomiting and diarrhea after starting ceftin and her PMD switched her to ciprofloxacin on  and last day was yesterday. She reports her most recent travel was to [facility] in March/2021 and there she had COVID. States she did not require hospitalization, was at home and had virtual visits with physician there. States she has had a decreased appetite since her COVID infection, reports appetite is a 2-3 out of 10 since then. She reports being fully vaccinated for COVID. She reports she had been feeling well after returning from [facility] otherwise until this month. She has RA and was on steroids up until about 5 months ago, now currently on methotrexate and leflunomide; last dose of methotrexate was on . She last saw her rheumatologist on  and states all was fine. Denies past personal or any family history of PE/DVT/clotting disorder.  In the ED, vitals were temp 97.6F, HR 88, /79, RR 16, O2 99% on RA. Labs were significant for H/H 9.1/28.8, PT 15.6, INR 1.35, glucose 129, calcium 8.2, albumin 2.1. EKG NSR. CT abdom/pelvis: negative. S/p 2.4L NS (2021 15:40)  Patient seen and examined at bedside this morning. Patient continues to have nonproductive cough, denies chest pain or dyspnea at this time. Has had dyspnea when climbing stairs at home. Patient reports having a small amount of vomiting earlier, attributes to gastric issues. Had 2 soft bowel movements overnight, not diarrhea, no abdominal pain. She feels slightly warm now but denies any fever or chills overnight. Denies any joint pains or swelling. Denies urinary symptoms, no hematuria, back pain. No other complaints.   ROS: 14 point review of systems completed, pertinent positives and negatives as per HPI.    Allergies: gentamicin (Chills)    PMH -- Rheumatoid arthritis  Hypertension  Anemia  Peptic ulcer    PSH -- H/O knee surgery  Age related cataract    FH -- FH: throat cancer  Family history of myocardial infarction    Social History -- former smoker - quit 40 years ago, smoked 1 PPD x1-2 years, drinks 1 alcoholic drink per week, denies  illicit drug use. Grew up in [facility], moved to the  in ; , lives with family      Physical Exam--  Vital Signs Last 24 Hrs  T(F): 98.9 (2021 05:25), Max: 100.4 (2021 20:08)  HR: 100 (2021 05:25) (83 - 100)  BP: 161/73 (2021 05:25) (124/66 - 161/73)  RR: 18 (2021 05:25) (16 - 19)  SpO2: 92% (2021 06:17) (90% - 98%)  General: nontoxic-appearing, no acute distress  HEENT: NC/AT, EOMI, anicteric, conjunctiva pink and moist, neck supple  Lungs: Clear bilaterally without rales, wheezing or rhonchi  Heart: Regular rate and rhythm. No murmur, rub or gallop.  Abdomen: Soft. Nondistended. Nontender. BS present.   Neuro: AAOx3, no obvious focal deficits   Back: No spinal tenderness. No costovertebral angle tenderness.  Extremities: No cyanosis or clubbing. No edema.   Skin: Warm. Dry. Good turgor. No visible rash. No vasculitic stigmata.  Psychiatric: Appropriate affect and mood for situation.   Lines: PIV without erythema/TTP    Laboratory & Imaging Data--  CBC:                       9.6    6.72  )-----------( 442      ( 2021 09:13 )             28.7     WBC Count: 6.72 K/uL (21 @ 09:13)  WBC Count: 7.06 K/uL (21 @ 12:31)    CMP:     140  |  112<H>  |  9   ----------------------------<  98  3.9   |  20<L>  |  0.69    Ca    8.5      2021 09:13    TPro  6.7  /  Alb  2.1<L>  /  TBili  0.4  /  DBili  x   /  AST  54<H>  /  ALT  29  /  AlkPhos  91      LIVER FUNCTIONS - ( 2021 09:13 )  Alb: 2.1 g/dL / Pro: 6.7 g/dL / ALK PHOS: 91 U/L / ALT: 29 U/L / AST: 54 U/L / GGT: x           Urinalysis Basic - ( 2021 13:53 )  Color: Pale Yellow / Appearance: Clear / S.005 / pH: x  Gluc: x / Ketone: Negative  / Bili: Negative / Urobili: Negative   Blood: x / Protein: Negative / Nitrite: Negative   Leuk Esterase: Trace / RBC: x / WBC 0-2   Sq Epi: x / Non Sq Epi: Occasional / Bacteria: x    Microbiology:    - RVP/COVID - +Adenovirus     Prior cultures:  Culture - Urine (collected 21 @ 00:34)  Source: Clean Catch Clean Catch (Midstream)  Final Report (11-15-21 @ 14:48):    >=3 organisms. Probable collection contamination.    Culture - Blood (collected 21 @ 20:38)  Source: .Blood Blood-Peripheral  Final Report (21 @ 21:00):    No Growth Final    Culture - Blood (collected 21 @ 20:38)  Source: .Blood Blood-Peripheral  Final Report (21 @ 21:00):    No Growth Final    Radiology--  CT Abdomen and Pelvis w/ IV Cont (21 @ 14:07) >IMPRESSION: No acute findings.  Xray Chest 1 View- PORTABLE-Urgent (21 @ 16:19) >IMPRESSION: Negative chest.    Active Medications--  acetaminophen     Tablet .. 650 milliGRAM(s) Oral every 6 hours PRN  benzonatate 100 milliGRAM(s) Oral every 8 hours PRN  cyanocobalamin 1000 MICROGram(s) Oral daily  ferrous    sulfate 325 milliGRAM(s) Oral daily  folic acid 1 milliGRAM(s) Oral daily  guaiFENesin Oral Liquid (Sugar-Free) 200 milliGRAM(s) Oral every 6 hours PRN  leflunomide 20 milliGRAM(s) Oral daily  lisinopril 20 milliGRAM(s) Oral daily  metoprolol succinate ER 50 milliGRAM(s) Oral daily  multivitamin 1 Tablet(s) Oral daily  pantoprazole    Tablet 40 milliGRAM(s) Oral before breakfast    Antimicrobials: none  recently completed ciprofloxacin course output

## 2021-11-23 NOTE — PROVIDER CONTACT NOTE (OTHER) - SITUATION
Pt had episode of nausea about 45 minutes ago. She feels better now, and is able to tolerate her lunch.

## 2021-11-23 NOTE — CONSULT NOTE ADULT - ASSESSMENT
pt with RA - Anemia - Adenovirus - eval for Fever - Cough - SOB - PETERSON -     ct abd noted - small hiatal hernia - no acute findings  isolation precs for Adenovirus -   cough Rx regimen - Tessalon - Robitussin PRN   pt reports occ SOB and PETERSON - climbing stairs in the house - may need Cardiac w/u - will check proBNP - consideration for TTE in pt with RA - eval for Pulm HTN -   pt may have SOB PETERSON related to Anemia - prob related to ACD - hx of RA  monitor VS and HD and Sat  TSH normal  cvs rx regimen and BP control  check D dimer

## 2021-11-23 NOTE — PROGRESS NOTE ADULT - PROBLEM SELECTOR PLAN 1
-Patient with fever for the past 2 weeks, unclear etiology. Also has associated with mild cough and SOB for the past few days. Traveled to [place] in March/April 2021.   -Has hx of RA which may be contributing to fevers. Has cough and sob with + Adenovirus, viral bronchitis. Symptomatic management   -Urine cx from 11/14 showed >3 organisms, likely contaminant   -BCx x2 from 11/14 show NGTD  -F/u repeat cultures  -CT abdom/pelvis negative  -Ideally, would obtain CTA chest but patient s/p contrast today with CT abdomen/pelvis. WIll order d-dimer   -Tylenol PRN   -ID consulted Dr. Gonzalez as well as Pulm   -Check BNP and TTE

## 2021-11-23 NOTE — PROGRESS NOTE ADULT - PROBLEM SELECTOR PLAN 2
-H/H 9.1/28, Hb appears to be frequently at this level based on prior records, but patient also has HB in 11-12 range sporadically  -S/p EGD during admission on 7/2019 for concern of  which showed small non-bleeding linear ulcers at GE junction  -Last colonoscopy 3 years ago reportedly WNL  -Continue PPI  -No active bleeding at this time  -Monitor CBC  -F/u iron studies  -F/u FOBT

## 2021-11-24 ENCOUNTER — TRANSCRIPTION ENCOUNTER (OUTPATIENT)
Age: 71
End: 2021-11-24

## 2021-11-24 VITALS
SYSTOLIC BLOOD PRESSURE: 136 MMHG | HEART RATE: 89 BPM | RESPIRATION RATE: 18 BRPM | DIASTOLIC BLOOD PRESSURE: 78 MMHG | TEMPERATURE: 99 F | OXYGEN SATURATION: 93 %

## 2021-11-24 LAB
-  AMIKACIN: SIGNIFICANT CHANGE UP
-  AMOXICILLIN/CLAVULANIC ACID: SIGNIFICANT CHANGE UP
-  AMPICILLIN/SULBACTAM: SIGNIFICANT CHANGE UP
-  AMPICILLIN: SIGNIFICANT CHANGE UP
-  AZTREONAM: SIGNIFICANT CHANGE UP
-  CEFAZOLIN: SIGNIFICANT CHANGE UP
-  CEFEPIME: SIGNIFICANT CHANGE UP
-  CEFOXITIN: SIGNIFICANT CHANGE UP
-  CEFTRIAXONE: SIGNIFICANT CHANGE UP
-  CIPROFLOXACIN: SIGNIFICANT CHANGE UP
-  ERTAPENEM: SIGNIFICANT CHANGE UP
-  GENTAMICIN: SIGNIFICANT CHANGE UP
-  IMIPENEM: SIGNIFICANT CHANGE UP
-  LEVOFLOXACIN: SIGNIFICANT CHANGE UP
-  MEROPENEM: SIGNIFICANT CHANGE UP
-  NITROFURANTOIN: SIGNIFICANT CHANGE UP
-  PIPERACILLIN/TAZOBACTAM: SIGNIFICANT CHANGE UP
-  TIGECYCLINE: SIGNIFICANT CHANGE UP
-  TOBRAMYCIN: SIGNIFICANT CHANGE UP
-  TRIMETHOPRIM/SULFAMETHOXAZOLE: SIGNIFICANT CHANGE UP
ANION GAP SERPL CALC-SCNC: 8 MMOL/L — SIGNIFICANT CHANGE UP (ref 5–17)
BUN SERPL-MCNC: 10 MG/DL — SIGNIFICANT CHANGE UP (ref 7–23)
CALCIUM SERPL-MCNC: 8.8 MG/DL — SIGNIFICANT CHANGE UP (ref 8.5–10.1)
CHLORIDE SERPL-SCNC: 111 MMOL/L — HIGH (ref 96–108)
CO2 SERPL-SCNC: 22 MMOL/L — SIGNIFICANT CHANGE UP (ref 22–31)
CREAT SERPL-MCNC: 0.69 MG/DL — SIGNIFICANT CHANGE UP (ref 0.5–1.3)
CRP SERPL-MCNC: 81 MG/L — HIGH
CULTURE RESULTS: SIGNIFICANT CHANGE UP
GLUCOSE SERPL-MCNC: 122 MG/DL — HIGH (ref 70–99)
HCT VFR BLD CALC: 26.9 % — LOW (ref 34.5–45)
HCT VFR BLD CALC: 26.9 % — LOW (ref 34.5–45)
HCT VFR BLD CALC: 27.7 % — LOW (ref 34.5–45)
HGB BLD-MCNC: 8.9 G/DL — LOW (ref 11.5–15.5)
HGB BLD-MCNC: 9.1 G/DL — LOW (ref 11.5–15.5)
HGB BLD-MCNC: 9.1 G/DL — LOW (ref 11.5–15.5)
MAGNESIUM SERPL-MCNC: 2 MG/DL — SIGNIFICANT CHANGE UP (ref 1.6–2.6)
MCHC RBC-ENTMCNC: 32.9 GM/DL — SIGNIFICANT CHANGE UP (ref 32–36)
MCHC RBC-ENTMCNC: 33 PG — SIGNIFICANT CHANGE UP (ref 27–34)
MCV RBC AUTO: 100.4 FL — HIGH (ref 80–100)
METHOD TYPE: SIGNIFICANT CHANGE UP
NRBC # BLD: 0 /100 WBCS — SIGNIFICANT CHANGE UP (ref 0–0)
ORGANISM # SPEC MICROSCOPIC CNT: SIGNIFICANT CHANGE UP
ORGANISM # SPEC MICROSCOPIC CNT: SIGNIFICANT CHANGE UP
PLATELET # BLD AUTO: 417 K/UL — HIGH (ref 150–400)
POTASSIUM SERPL-MCNC: 3.5 MMOL/L — SIGNIFICANT CHANGE UP (ref 3.5–5.3)
POTASSIUM SERPL-SCNC: 3.5 MMOL/L — SIGNIFICANT CHANGE UP (ref 3.5–5.3)
RBC # BLD: 2.76 M/UL — LOW (ref 3.8–5.2)
RBC # FLD: 15.7 % — HIGH (ref 10.3–14.5)
SODIUM SERPL-SCNC: 141 MMOL/L — SIGNIFICANT CHANGE UP (ref 135–145)
SPECIMEN SOURCE: SIGNIFICANT CHANGE UP
WBC # BLD: 5.64 K/UL — SIGNIFICANT CHANGE UP (ref 3.8–10.5)
WBC # FLD AUTO: 5.64 K/UL — SIGNIFICANT CHANGE UP (ref 3.8–10.5)

## 2021-11-24 PROCEDURE — 96361 HYDRATE IV INFUSION ADD-ON: CPT

## 2021-11-24 PROCEDURE — 85610 PROTHROMBIN TIME: CPT

## 2021-11-24 PROCEDURE — 96360 HYDRATION IV INFUSION INIT: CPT

## 2021-11-24 PROCEDURE — 83690 ASSAY OF LIPASE: CPT

## 2021-11-24 PROCEDURE — 71275 CT ANGIOGRAPHY CHEST: CPT | Mod: MA

## 2021-11-24 PROCEDURE — 87086 URINE CULTURE/COLONY COUNT: CPT

## 2021-11-24 PROCEDURE — 85018 HEMOGLOBIN: CPT

## 2021-11-24 PROCEDURE — 85652 RBC SED RATE AUTOMATED: CPT

## 2021-11-24 PROCEDURE — G0378: CPT

## 2021-11-24 PROCEDURE — 36415 COLL VENOUS BLD VENIPUNCTURE: CPT

## 2021-11-24 PROCEDURE — 85730 THROMBOPLASTIN TIME PARTIAL: CPT

## 2021-11-24 PROCEDURE — 93005 ELECTROCARDIOGRAM TRACING: CPT

## 2021-11-24 PROCEDURE — 87040 BLOOD CULTURE FOR BACTERIA: CPT

## 2021-11-24 PROCEDURE — 85379 FIBRIN DEGRADATION QUANT: CPT

## 2021-11-24 PROCEDURE — 82607 VITAMIN B-12: CPT

## 2021-11-24 PROCEDURE — 93306 TTE W/DOPPLER COMPLETE: CPT | Mod: 26

## 2021-11-24 PROCEDURE — 99285 EMERGENCY DEPT VISIT HI MDM: CPT | Mod: 25

## 2021-11-24 PROCEDURE — 82746 ASSAY OF FOLIC ACID SERUM: CPT

## 2021-11-24 PROCEDURE — 83880 ASSAY OF NATRIURETIC PEPTIDE: CPT

## 2021-11-24 PROCEDURE — 81001 URINALYSIS AUTO W/SCOPE: CPT

## 2021-11-24 PROCEDURE — 0225U NFCT DS DNA&RNA 21 SARSCOV2: CPT

## 2021-11-24 PROCEDURE — 93306 TTE W/DOPPLER COMPLETE: CPT

## 2021-11-24 PROCEDURE — 83605 ASSAY OF LACTIC ACID: CPT

## 2021-11-24 PROCEDURE — 74177 CT ABD & PELVIS W/CONTRAST: CPT | Mod: MA

## 2021-11-24 PROCEDURE — 83550 IRON BINDING TEST: CPT

## 2021-11-24 PROCEDURE — 86140 C-REACTIVE PROTEIN: CPT

## 2021-11-24 PROCEDURE — 80048 BASIC METABOLIC PNL TOTAL CA: CPT

## 2021-11-24 PROCEDURE — 71045 X-RAY EXAM CHEST 1 VIEW: CPT

## 2021-11-24 PROCEDURE — 85014 HEMATOCRIT: CPT

## 2021-11-24 PROCEDURE — 85027 COMPLETE CBC AUTOMATED: CPT

## 2021-11-24 PROCEDURE — 84484 ASSAY OF TROPONIN QUANT: CPT

## 2021-11-24 PROCEDURE — 83735 ASSAY OF MAGNESIUM: CPT

## 2021-11-24 PROCEDURE — 82728 ASSAY OF FERRITIN: CPT

## 2021-11-24 PROCEDURE — 80053 COMPREHEN METABOLIC PANEL: CPT

## 2021-11-24 PROCEDURE — 84443 ASSAY THYROID STIM HORMONE: CPT

## 2021-11-24 PROCEDURE — 71275 CT ANGIOGRAPHY CHEST: CPT | Mod: 26,MA

## 2021-11-24 PROCEDURE — 85025 COMPLETE CBC W/AUTO DIFF WBC: CPT

## 2021-11-24 PROCEDURE — 83540 ASSAY OF IRON: CPT

## 2021-11-24 PROCEDURE — 84466 ASSAY OF TRANSFERRIN: CPT

## 2021-11-24 PROCEDURE — C8929: CPT

## 2021-11-24 PROCEDURE — 87186 SC STD MICRODIL/AGAR DIL: CPT

## 2021-11-24 RX ORDER — FERROUS SULFATE 325(65) MG
1 TABLET ORAL
Qty: 0 | Refills: 0 | DISCHARGE
Start: 2021-11-24

## 2021-11-24 RX ORDER — LISINOPRIL 2.5 MG/1
20 TABLET ORAL AT BEDTIME
Refills: 0 | Status: DISCONTINUED | OUTPATIENT
Start: 2021-11-24 | End: 2021-11-25

## 2021-11-24 RX ORDER — ACETAMINOPHEN 500 MG
2 TABLET ORAL
Qty: 0 | Refills: 0 | DISCHARGE
Start: 2021-11-24

## 2021-11-24 RX ORDER — PANTOPRAZOLE SODIUM 20 MG/1
1 TABLET, DELAYED RELEASE ORAL
Qty: 60 | Refills: 0
Start: 2021-11-24 | End: 2021-12-23

## 2021-11-24 RX ADMIN — Medication 1 MILLIGRAM(S): at 17:38

## 2021-11-24 RX ADMIN — PANTOPRAZOLE SODIUM 40 MILLIGRAM(S): 20 TABLET, DELAYED RELEASE ORAL at 09:01

## 2021-11-24 RX ADMIN — PREGABALIN 1000 MICROGRAM(S): 225 CAPSULE ORAL at 17:38

## 2021-11-24 RX ADMIN — Medication 200 MILLIGRAM(S): at 05:31

## 2021-11-24 RX ADMIN — Medication 50 MILLIGRAM(S): at 05:31

## 2021-11-24 RX ADMIN — Medication 1 TABLET(S): at 17:38

## 2021-11-24 RX ADMIN — LEFLUNOMIDE 20 MILLIGRAM(S): 10 TABLET ORAL at 17:38

## 2021-11-24 RX ADMIN — Medication 325 MILLIGRAM(S): at 17:38

## 2021-11-24 NOTE — DISCHARGE NOTE PROVIDER - NSDCMRMEDTOKEN_GEN_ALL_CORE_FT
acetaminophen 325 mg oral tablet: 2 tab(s) orally every 6 hours, As needed, Temp greater or equal to 38C (100.4F)  benzonatate 100 mg oral capsule: 1 cap(s) orally every 8 hours, As needed, Cough  Calcium 600+D oral tablet: 1 tab(s) orally once a day  ferrous sulfate 325 mg (65 mg elemental iron) oral tablet: 1 tab(s) orally once a day  folic acid 1 mg oral tablet: 1 tab(s) orally once a day  guaiFENesin 100 mg/5 mL oral liquid: 10 milliliter(s) orally every 6 hours, As needed, Cough  leflunomide 20 mg oral tablet: 1 tab(s) orally once a day  lisinopril 20 mg oral tablet: 1 tab(s) orally once a day  methotrexate: 12.5 milligram(s) orally once a week on Sunday  metoprolol succinate 50 mg oral tablet, extended release: 1 tab(s) orally once a day  Multiple Vitamins oral tablet: 1 tab(s) orally once a day  Protonix 40 mg oral delayed release tablet: 1 tab(s) orally once a day  Vitamin B-12 1000 mcg oral tablet: 1 tab(s) orally once a day

## 2021-11-24 NOTE — PROVIDER CONTACT NOTE (OTHER) - ASSESSMENT
Pt due for lisinopril 20mg PO as per pt she take medication at night time.   Current /76 HR 89  due metoprolol 50mg PO given. MD verbalized understanding to d/c current lisinopril time and change to HS.
Pt report she's vegetarian. MD notified to change patients diet to vegan as pt does not eat meat. MD verbalized stated he will change diet.

## 2021-11-24 NOTE — DISCHARGE NOTE NURSING/CASE MANAGEMENT/SOCIAL WORK - PATIENT PORTAL LINK FT
You can access the FollowMyHealth Patient Portal offered by [facility] by registering at the following website: [email or web address]. By joining [facility]’s FollowMyHealth portal, you will also be able to view your health information using other applications (apps) compatible with our system.

## 2021-11-24 NOTE — PROGRESS NOTE ADULT - ASSESSMENT
71F with PMH HTN, HLD, RA presents with fever for the past 2 weeks. Admitted for fever of unknown origin, found to have viral bronchitis due to adenovirus.     #Fever due to viral bronchitis in setting of adenovirus  -Blood cultures x 2 no growth  -Supportive care with Amee Arauz PRN  -Check CTA chest due to elevated d-dimer to r/o PE given hx of recent travel  -Tylenol PRN   -ID Dr Gonzalez consulted, recommendations appreciated  -Follow up TTE    #Anemia  -s/p EGD 7/2019 non-bleeding ulcers at GE junction. Last colonoscopy 3 years ago reportedly WNL  -Continue PPI  -Repeat H/H, slowly downtrending. Reports episode of bright blood when wiping  -iron studies consistent with anemia of chronic disease  -Repeat H/H  -Continue iron, B12, folic acid    #Thrombocytosis  -Likely reactive to above  -Follow up AM CBC    #HTN  -Chronic, continue Lisinopril, Metoprolol  -Monitor vitals    #Rheumatoid Arthritis   -Continue Leflunomide    #Prophylactic Measure  -DVT ppx: SCDs      Dispo: DC planning for today pending CTA chest  Discussed with daughter Colt [phone] aware and in agreement with above.
pt with RA - Anemia - Adenovirus - eval for Fever - Cough - SOB - PETERSON -     ID eval noted  on Isolation for Adenovirus  Biomarkers noted  proBNP noted  Planned for TTE    ct abd noted - small hiatal hernia - no acute findings  isolation precs for Adenovirus -   cough Rx regimen - Tessalon - Robitussin PRN   pt reports occ SOB and PETERSON - climbing stairs in the house - w/u in progress -  TTE   pt may have SOB PETERSON related to Anemia - prob related to ACD - hx of RA  monitor VS and HD and Sat  TSH normal  cvs rx regimen and BP control  check D dimer
Patient is a 71 year old female with PMH HTN, HLD, and RA who presented with complaint of fever, cough and dyspnea with fever due to unknown origin.  Fever - suspect likely due to viral illness  Bronchitis due to Adenovirus   H/o RA on methotrexate (last dose 11/21), leflunomide, off steroids x5 months   - immunocompromised, may be contributing to prolonged fevers  H/o COVID in March-April 2021 in [facility], did not require hospitalization, dec appetite since COVID infection.   COVID vaccinated  Anemia    RVP + for Adenovirus, on isolation precautions per infection control  COVID negative. CXR reviewed - no infiltrate/consolidation/cavitations noted  CTAP with small hiatal hernia, no acute pathology  Blood cultures NGTD  no urinary sx currently, UA negative, recently completed ciprofloxacin course, Ucx noted, no need to treat   prior cx  reviewed, - 11/14 urine culture with >3 organisms, likely contaminated, blood cultures negative  Currently afebrile x24h. No leukocytosis.     Recommendations:   Pulmonary following  TTE pending   continue off antibiotics  continue supportive care, cough suppressants prn  monitor H/H, anemia management per primary team  monitor temps/WBC    Dr. Gonzalez will be covering 11/25-11/28
70 y/o female with PMHx HTN, HLD, and RA presents today due to fever for the past 2 weeks. Admitted for fever of unknown origin, found to have Viral bronchitis due to adenovirus

## 2021-11-24 NOTE — DISCHARGE NOTE PROVIDER - NSDCFUSCHEDAPPT_GEN_ALL_CORE_FT
NITHIN TRUONG ; 12/16/2021 ; [facility] [address]  NITHIN TRUONG ; 12/17/2021 ; [facility] [address]

## 2021-11-24 NOTE — DISCHARGE NOTE PROVIDER - CARE PROVIDER_API CALL
Magi Villanueva  INTERNAL MEDICINE  [address] [place] [address]  Phone: [phone]  Fax: [phone]  Follow Up Time:     BLAS HEBERT  Rheumatology  [address] [place] [id number]  Phone: [phone]  Fax: [phone]  Follow Up Time:

## 2021-11-24 NOTE — PROGRESS NOTE ADULT - SUBJECTIVE AND OBJECTIVE BOX
[facility], Division of Infectious Diseases  LUCILA Fernandez Y. Patel, S. Shah, G. Casimir  [phone]  ([phone] - weekdays after 5pm and weekends)    Name: NITHIN TRUONG  Age/Gender: 71y Female  MRN: [id number]    Interval History:  Patient seen this morning.  States cough and breathing are better, no fevers or chills.  States has dark stools d/t iron, saw some blood when wiping after bm.  Denies abdominal pain, nausea, vomiting or any other complaints.   Notes reviewed. Afebrile.    Allergies: gentamicin (Chills)    Objective:  Vitals:   T(F): 98.5 (21 @ 05:10), Max: 98.9 (21 @ 12:30)  HR: 89 (21 @ 05:10) (81 - 90)  BP: 135/76 (21 @ 05:10) (103/61 - 135/76)  RR: 18 (21 @ 05:10) (18 - 18)  SpO2: 91% (21 @ 05:10) (91% - 93%)  Physical Examination:  PHYSICAL EXAM:  General: no acute distress, nontoxic appearing  HEENT: NC/AT, anicteric, supple  Respiratory: no acc muscle use, breathing comfortably  Cardiovascular: S1 S2 present, normal rate  Gastrointestinal: normal appearing, nondistended  Extremities: no edema, no cyanosis    Laboratory Studies:  CBC:                       9.1    5.64  )-----------( 417      ( 2021 09:07 )             27.7     WBC Trend:  5.64 21 @ 09:07  6.72 21 @ 09:13  7.06 21 @ 12:31    CMP:     141  |  111<H>  |  10  ----------------------------<  122<H>  3.5   |  22  |  0.69    Ca    8.8      2021 09:07  Mg     2.0         TPro  6.7  /  Alb  2.1<L>  /  TBili  0.4  /  DBili  x   /  AST  54<H>  /  ALT  29  /  AlkPhos  91  11    LIVER FUNCTIONS - ( 2021 09:13 )  Alb: 2.1 g/dL / Pro: 6.7 g/dL / ALK PHOS: 91 U/L / ALT: 29 U/L / AST: 54 U/L / GGT: x           Urinalysis Basic - ( 2021 13:53 )  Color: Pale Yellow / Appearance: Clear / S.005 / pH: x  Gluc: x / Ketone: Negative  / Bili: Negative / Urobili: Negative   Blood: x / Protein: Negative / Nitrite: Negative   Leuk Esterase: Trace / RBC: x / WBC 0-2   Sq Epi: x / Non Sq Epi: Occasional / Bacteria: x    Microbiology: reviewed   Culture - Urine (collected 21 @ 19:26)  Source: Clean Catch Clean Catch (Midstream)  Preliminary Report (21 @ 17:07):    10,000 - 49,000 CFU/mL Gram Negative Rods    Culture - Blood (collected 21 @ 18:25)  Source: .Blood Blood-Peripheral  Preliminary Report (21 @ 19:01):    No growth to date.    Culture - Blood (collected 21 @ 18:25)  Source: .Blood Blood-Peripheral  Preliminary Report (21 @ 19:01):    No growth to date.    Culture - Urine (collected 21 @ 00:34)  Source: Clean Catch Clean Catch (Midstream)  Final Report (11-15-21 @ 14:48):    >=3 organisms. Probable collection contamination.    Culture - Blood (collected 21 @ 20:38)  Source: .Blood Blood-Peripheral  Final Report (21 @ 21:00):    No Growth Final    Culture - Blood (collected 21 @ 20:38)  Source: .Blood Blood-Peripheral  Final Report (21 @ 21:00):    No Growth Final    Radiology: reviewed     Medications:  acetaminophen     Tablet .. 650 milliGRAM(s) Oral every 6 hours PRN  benzonatate 100 milliGRAM(s) Oral every 8 hours PRN  cyanocobalamin 1000 MICROGram(s) Oral daily  ferrous    sulfate 325 milliGRAM(s) Oral daily  folic acid 1 milliGRAM(s) Oral daily  guaiFENesin Oral Liquid (Sugar-Free) 200 milliGRAM(s) Oral every 6 hours PRN  leflunomide 20 milliGRAM(s) Oral daily  lisinopril 20 milliGRAM(s) Oral at bedtime  metoprolol succinate ER 50 milliGRAM(s) Oral daily  multivitamin 1 Tablet(s) Oral daily  pantoprazole    Tablet 40 milliGRAM(s) Oral before breakfast    Antimicrobials: none  
Date/Time Patient Seen:  		  Referring MD:   Data Reviewed	       Patient is a 71y old  Female who presents with a chief complaint of fever of unknown origin (23 Nov 2021 11:25)      Subjective/HPI     PAST MEDICAL & SURGICAL HISTORY:  Rheumatoid arthritis    Hypertension    Anemia    Peptic ulcer    H/O knee surgery  Bilateral    Age related cataract  OU          Medication list         MEDICATIONS  (STANDING):  cyanocobalamin 1000 MICROGram(s) Oral daily  ferrous    sulfate 325 milliGRAM(s) Oral daily  folic acid 1 milliGRAM(s) Oral daily  leflunomide 20 milliGRAM(s) Oral daily  lisinopril 20 milliGRAM(s) Oral daily  metoprolol succinate ER 50 milliGRAM(s) Oral daily  multivitamin 1 Tablet(s) Oral daily  pantoprazole    Tablet 40 milliGRAM(s) Oral before breakfast    MEDICATIONS  (PRN):  acetaminophen     Tablet .. 650 milliGRAM(s) Oral every 6 hours PRN Temp greater or equal to 38C (100.4F)  benzonatate 100 milliGRAM(s) Oral every 8 hours PRN Cough  guaiFENesin Oral Liquid (Sugar-Free) 200 milliGRAM(s) Oral every 6 hours PRN Cough         Vitals log        ICU Vital Signs Last 24 Hrs  T(C): 36.9 (24 Nov 2021 05:10), Max: 37.2 (23 Nov 2021 12:30)  T(F): 98.5 (24 Nov 2021 05:10), Max: 98.9 (23 Nov 2021 12:30)  HR: 89 (24 Nov 2021 05:10) (81 - 90)  BP: 135/76 (24 Nov 2021 05:10) (103/61 - 135/76)  BP(mean): --  ABP: --  ABP(mean): --  RR: 18 (24 Nov 2021 05:10) (18 - 18)  SpO2: 91% (24 Nov 2021 05:10) (91% - 93%)           Input and Output:  I&O's Detail    22 Nov 2021 07:01  -  23 Nov 2021 07:00  --------------------------------------------------------  IN:    Oral Fluid: 480 mL  Total IN: 480 mL    OUT:  Total OUT: 0 mL    Total NET: 480 mL          Lab Data                        9.6    6.72  )-----------( 442      ( 23 Nov 2021 09:13 )             28.7     11-23    140  |  112<H>  |  9   ----------------------------<  98  3.9   |  20<L>  |  0.69    Ca    8.5      23 Nov 2021 09:13    TPro  6.7  /  Alb  2.1<L>  /  TBili  0.4  /  DBili  x   /  AST  54<H>  /  ALT  29  /  AlkPhos  91  11-23            Review of Systems	      Objective     Physical Examination    heart s1s2  lung dec BS  abd soft  head nc      Pertinent Lab findings & Imaging      Dhaval:  NO   Adequate UO     I&O's Detail    22 Nov 2021 07:01  -  23 Nov 2021 07:00  --------------------------------------------------------  IN:    Oral Fluid: 480 mL  Total IN: 480 mL    OUT:  Total OUT: 0 mL    Total NET: 480 mL               Discussed with:     Cultures:	        Radiology                            
Patient is a 71y old  Female who presents with a chief complaint of fever of unknown origin (2021 10:20)      INTERVAL HPI/OVERNIGHT EVENTS: Patient seen and examined at bedside. No overnight events.   Afebrile. Feels better. Reports small amount of bright red blood per rectum after having a BM.     MEDICATIONS  (STANDING):  cyanocobalamin 1000 MICROGram(s) Oral daily  ferrous    sulfate 325 milliGRAM(s) Oral daily  folic acid 1 milliGRAM(s) Oral daily  leflunomide 20 milliGRAM(s) Oral daily  lisinopril 20 milliGRAM(s) Oral at bedtime  metoprolol succinate ER 50 milliGRAM(s) Oral daily  multivitamin 1 Tablet(s) Oral daily  pantoprazole    Tablet 40 milliGRAM(s) Oral before breakfast    MEDICATIONS  (PRN):  acetaminophen     Tablet .. 650 milliGRAM(s) Oral every 6 hours PRN Temp greater or equal to 38C (100.4F)  benzonatate 100 milliGRAM(s) Oral every 8 hours PRN Cough  guaiFENesin Oral Liquid (Sugar-Free) 200 milliGRAM(s) Oral every 6 hours PRN Cough      Allergies    gentamicin (Chills)    Intolerances        REVIEW OF SYSTEMS:  CONSTITUTIONAL: No fever or chills  HEENT:  No headache, no sore throat  RESPIRATORY: improving cough, no wheezing, or shortness of breath  CARDIOVASCULAR: No chest pain, palpitations  GASTROINTESTINAL: No abd pain, nausea, vomiting, or diarrhea  GENITOURINARY: No dysuria, frequency  NEUROLOGICAL: no focal weakness or dizziness  MUSCULOSKELETAL: no myalgias     Vital Signs Last 24 Hrs  T(C): 36.9 (2021 05:10), Max: 37.2 (2021 12:30)  T(F): 98.5 (2021 05:10), Max: 98.9 (2021 12:30)  HR: 89 (2021 05:10) (81 - 90)  BP: 135/76 (2021 05:10) (103/61 - 135/76)  BP(mean): --  RR: 18 (2021 05:10) (18 - 18)  SpO2: 91% (2021 05:10) (91% - 93%)    PHYSICAL EXAM:  GENERAL: NAD,   HEENT:  anicteric, moist mucous membranes  CHEST/LUNG: poor inspiratory effort, diminished breath sounds at bases, no wheezing  HEART:  RRR, S1, S2  ABDOMEN:  BS+, soft, nontender, nondistended  EXTREMITIES: no edema, cyanosis, or calf tenderness  NERVOUS SYSTEM: answers questions and follows commands appropriately, moves all extremities, A&Ox3    LABS:                        9.1    5.64  )-----------( 417      ( 2021 09:07 )             27.7         141  |  111  |  10  ----------------------------<  122  3.5   |  22  |  0.69    Ca    8.8      2021 09:07  Mg     2.0         TPro  6.7  /  Alb  2.1  /  TBili  0.4  /  DBili  x   /  AST  54  /  ALT  29  /  AlkPhos  91        Lipase, Serum: 98 U/L (21 @ 12:31)    eGFR if African American: 101 mL/min/1.73M2 (21 @ 09:07)  eGFR if Non African American: 87 mL/min/1.73M2 (21 @ 09:07)    PT/INR - ( 2021 12:31 )   PT: 15.6 sec;   INR: 1.35 ratio         PTT - ( 2021 12:31 )  PTT:33.7 sec  Lactate, Blood: 1.7 mmol/L ( @ 12:31)            TSH 0.64   TSH with FT4 reflex --  Total T3 --                  Urinalysis Basic - ( 2021 13:53 )    Color: Pale Yellow / Appearance: Clear / S.005 / pH: x  Gluc: x / Ketone: Negative  / Bili: Negative / Urobili: Negative   Blood: x / Protein: Negative / Nitrite: Negative   Leuk Esterase: Trace / RBC: x / WBC 0-2   Sq Epi: x / Non Sq Epi: Occasional / Bacteria: x        Culture - Urine (collected 2021 19:26)  Source: Clean Catch Clean Catch (Midstream)  Preliminary Report (2021 17:07):    10,000 - 49,000 CFU/mL Gram Negative Rods    Culture - Blood (collected 2021 18:25)  Source: .Blood Blood-Peripheral  Preliminary Report (2021 19:01):    No growth to date.    Culture - Blood (collected 2021 18:25)  Source: .Blood Blood-Peripheral  Preliminary Report (2021 19:):    No growth to date.            Culture - Urine (collected 21 @ 19:26)  Source: Clean Catch Clean Catch (Midstream)  Preliminary Report (21 @ 17:07):    10,000 - 49,000 CFU/mL Gram Negative Rods    Culture - Blood (collected 21 @ 18:25)  Source: .Blood Blood-Peripheral  Preliminary Report (21 @ 19:01):    No growth to date.    Culture - Blood (collected 21 @ 18:25)  Source: .Blood Blood-Peripheral  Preliminary Report (21 @ 19:01):    No growth to date.        RADIOLOGY & ADDITIONAL TESTS: Personally reviewed.     Consultant(s) Notes Reviewed:  [x] YES  [ ] NO   Discussed with GILMER/CHRISTIANO, RN    
Patient is a 71y old  Female who presents with a chief complaint of fever of unknown origin (23 Nov 2021 09:09)       INTERVAL HPI/OVERNIGHT EVENTS: Patient seen and examined at bedside. C/o cough and sob. Denies chest pain, palpitation, dizziness.     MEDICATIONS  (STANDING):  cyanocobalamin 1000 MICROGram(s) Oral daily  ferrous    sulfate 325 milliGRAM(s) Oral daily  folic acid 1 milliGRAM(s) Oral daily  leflunomide 20 milliGRAM(s) Oral daily  lisinopril 20 milliGRAM(s) Oral daily  metoprolol succinate ER 50 milliGRAM(s) Oral daily  multivitamin 1 Tablet(s) Oral daily  pantoprazole    Tablet 40 milliGRAM(s) Oral before breakfast    MEDICATIONS  (PRN):  acetaminophen     Tablet .. 650 milliGRAM(s) Oral every 6 hours PRN Temp greater or equal to 38C (100.4F)  benzonatate 100 milliGRAM(s) Oral every 8 hours PRN Cough  guaiFENesin Oral Liquid (Sugar-Free) 200 milliGRAM(s) Oral every 6 hours PRN Cough      Allergies    gentamicin (Chills)    Intolerances        REVIEW OF SYSTEMS:  CONSTITUTIONAL: No fever this am,  or fatigue  EYES: No eye pain, visual disturbances, or discharge  ENMT:  No difficulty hearing, tinnitus, vertigo; No sinus or throat pain  NECK: No pain or stiffness  RESPIRATORY: + cough, no  wheezing, chills or hemoptysis; + shortness of breath  CARDIOVASCULAR: No chest pain, palpitations, dizziness, or leg swelling  GASTROINTESTINAL: No abdominal or epigastric pain. No nausea, vomiting, or hematemesis; No diarrhea or constipation.   GENITOURINARY: No dysuria, frequency, hematuria, or incontinence  NEUROLOGICAL: No headaches, memory loss, loss of strength, numbness, or tremors  SKIN: No itching, burning, rashes, or lesions   LYMPH NODES: No enlarged glands  ENDOCRINE: No heat or cold intolerance; No hair loss; No polydipsia or polyuria  MUSCULOSKELETAL: No joint pain or swelling; No muscle, back, or extremity pain  PSYCHIATRIC: No depression, anxiety, mood swings, or difficulty sleeping  HEME/LYMPH: No easy bruising, or bleeding gums  ALLERGY AND IMMUNOLOGIC: No hives or eczema    Vital Signs Last 24 Hrs  T(C): 37.2 (23 Nov 2021 05:25), Max: 38 (22 Nov 2021 20:08)  T(F): 98.9 (23 Nov 2021 05:25), Max: 100.4 (22 Nov 2021 20:08)  HR: 100 (23 Nov 2021 05:25) (83 - 100)  BP: 161/73 (23 Nov 2021 05:25) (124/66 - 161/73)  BP(mean): --  RR: 18 (23 Nov 2021 05:25) (16 - 19)  SpO2: 92% (23 Nov 2021 06:17) (90% - 99%)    PHYSICAL EXAM:  GENERAL: NAD, well-developed  HEAD:  Atraumatic, Normocephalic  EYES: EOMI, PERRLA, conjunctiva and sclera clear  ENMT: No tonsillar erythema, exudates, or enlargement; Moist mucous membranes  NECK: Supple, No JVD, Normal thyroid  NERVOUS SYSTEM:  Alert & Oriented X3,  Motor Strength 5/5 B/L upper and lower extremities  CHEST/LUNG: Decreased bibasilar breath sounds auscultation,  No rales, rhonchi, wheezing, or rubs  HEART: S1S2+,  Regular rate and rhythm  ABDOMEN: Soft, Nontender, Nondistended; Bowel sounds present  EXTREMITIES:  2+ Peripheral Pulses, No clubbing, cyanosis, or edema  LYMPH: No lymphadenopathy noted  SKIN: No rashes, warm, dry     LABS:                        9.6    6.72  )-----------( 442      ( 23 Nov 2021 09:13 )             28.7     23 Nov 2021 09:13    140    |  112    |  9      ----------------------------<  98     3.9     |  20     |  0.69     Ca    8.5        23 Nov 2021 09:13    TPro  6.7    /  Alb  2.1    /  TBili  0.4    /  DBili  x      /  AST  54     /  ALT  29     /  AlkPhos  91     23 Nov 2021 09:13    PT/INR - ( 22 Nov 2021 12:31 )   PT: 15.6 sec;   INR: 1.35 ratio         PTT - ( 22 Nov 2021 12:31 )  PTT:33.7 sec  CAPILLARY BLOOD GLUCOSE        BLOOD CULTURE    RADIOLOGY & ADDITIONAL TESTS:    Imaging Personally Reviewed:  [ ] YES     Consultant(s) Notes Reviewed:      Care Discussed with Consultants/Other Providers:

## 2021-11-24 NOTE — DISCHARGE NOTE PROVIDER - HOSPITAL COURSE
Hospital Course  71F with PMH HTN, HLD, RA presents with fever for the past 2 weeks. Admitted for fever of unknown origin, found to have viral bronchitis due to adenovirus. Blood cultures x2 no growth. Afebrile. Continue supportive care.   CTA chest performed to r/o PE.   Home medications continued.   ID, pulm consulted recommendations appreciated.     Time spent: 35 minutes.    Hospital Course  71F with PMH HTN, HLD, RA presents with fever for the past 2 weeks. Admitted for fever of unknown origin, found to have viral bronchitis due to adenovirus. Blood cultures x2 no growth. Afebrile. Continue supportive care.   CTA chest performed to r/o PE.   Home medications continued.   ID, pulm consulted recommendations appreciated.     Discussed CTA results with daughter and discharge plans.     Time spent: 35 minutes.

## 2021-11-24 NOTE — DISCHARGE NOTE PROVIDER - NSDCCPCAREPLAN_GEN_ALL_CORE_FT
PRINCIPAL DISCHARGE DIAGNOSIS  Diagnosis: Fever  Assessment and Plan of Treatment: Your fever was likely due to Adenovirus  -Continue supportive care with Tesselon Perles and Robitussin   -Clean all surfaces at home with disinfective wipes  -Stay hydrated with water  -Follow up with your primary care physician within the week for repeat blood work and continued management       PRINCIPAL DISCHARGE DIAGNOSIS  Diagnosis: Fever  Assessment and Plan of Treatment: Your fever was likely due to Adenovirus  -Continue supportive care with Tesselon Perles and Robitussin and Tylenol  -Clean all surfaces at home with disinfective wipes  -Stay hydrated with water  -Your CT chest showed two 6 mm nodules in the right middle and left lower lobes. Follow up with a chest CT in 6 months.   -Follow up with your primary care physician within the week for repeat blood work and continued management

## 2021-12-14 ENCOUNTER — NON-APPOINTMENT (OUTPATIENT)
Age: 71
End: 2021-12-14

## 2021-12-16 ENCOUNTER — APPOINTMENT (OUTPATIENT)
Dept: CARDIOLOGY | Facility: CLINIC | Age: 71
End: 2021-12-16
Payer: MEDICAID

## 2021-12-16 PROCEDURE — A9500: CPT

## 2021-12-16 PROCEDURE — 78452 HT MUSCLE IMAGE SPECT MULT: CPT

## 2021-12-16 PROCEDURE — 93015 CV STRESS TEST SUPVJ I&R: CPT

## 2021-12-17 ENCOUNTER — APPOINTMENT (OUTPATIENT)
Dept: CARDIOLOGY | Facility: CLINIC | Age: 71
End: 2021-12-17
Payer: MEDICAID

## 2021-12-17 ENCOUNTER — NON-APPOINTMENT (OUTPATIENT)
Age: 71
End: 2021-12-17

## 2021-12-17 VITALS
WEIGHT: 166 LBS | BODY MASS INDEX: 27.66 KG/M2 | HEIGHT: 65 IN | SYSTOLIC BLOOD PRESSURE: 130 MMHG | DIASTOLIC BLOOD PRESSURE: 70 MMHG | OXYGEN SATURATION: 99 % | HEART RATE: 92 BPM

## 2021-12-17 PROCEDURE — 99214 OFFICE O/P EST MOD 30 MIN: CPT

## 2021-12-17 PROCEDURE — 93000 ELECTROCARDIOGRAM COMPLETE: CPT

## 2021-12-17 NOTE — HISTORY OF PRESENT ILLNESS
[FreeTextEntry1] : Patient  with hx of rheumatoid arthritis , hypertension, HLD  came for follow up after having recommended tests , had normal chemical stress test  ,patient feels always weakness , patient was hospitalized with [facility] UTI , was treated , and discharged , patient feels like she has to take rest ,  \par \par patient stopped prednisone on her own   \par \par patient denies any chest pain  or palpitation or dizziness \par \par patients blood pressure is controlled on lisinopril 20 mg pod aily , .\par \par Patient does have occasional reflux symptoms on eating certain type of food controlled with omeprazole\par \par patient blood work showed normal lipid profile   \par \par patient is under care rheumatologist \par \par \par

## 2021-12-17 NOTE — ASSESSMENT
[FreeTextEntry1] :  Patient with above hx , who is having fatigue , unclear etiology may be decondition , no evidence of orthostasis ,  , no evidence of ischemia ,  normal ventricular function    ( she can not walk due to knees arthritis  )  hold atorvastatin , decrease metoprolol to 25 mg po daily , \par \par controlled hypertension without immediate orthostasis .patient  was advised to follow low salt diet , advised to continue lisinopril  20 mg po daily if her home BP continue to be higher than 130 SBP , \par \par occasional skipped heart beat ? PAC\par \par Mild pure hypercholesteremia with high HDL , now controlled  on atorvastatin 20 mg po daily , follow up of blood work \par \par chronic mild anemia ,  patient is on methotrexate , continue monitoring \par \par Mild pulmonary hypertension  stable , \par \par Mild to moderate MR , moderate TR  continue to monitor \par \par follow up after 6 weeks \par

## 2021-12-17 NOTE — CARDIOLOGY SUMMARY
[de-identified] : 12/17/21  sinus rhythm low QRS voltage  [de-identified] : 7/9/20 Mild to moderate MR , moderate TR RVSP 46 mm hg ( no significant change from )\par \par 11/24/2021  normal EF mild MR TR

## 2022-01-03 ENCOUNTER — APPOINTMENT (OUTPATIENT)
Dept: GASTROENTEROLOGY | Facility: HOSPITAL | Age: 72
End: 2022-01-03

## 2022-02-03 ENCOUNTER — LABORATORY RESULT (OUTPATIENT)
Age: 72
End: 2022-02-03

## 2022-02-07 ENCOUNTER — RESULT REVIEW (OUTPATIENT)
Age: 72
End: 2022-02-07

## 2022-02-07 ENCOUNTER — OUTPATIENT (OUTPATIENT)
Dept: OUTPATIENT SERVICES | Facility: HOSPITAL | Age: 72
LOS: 1 days | Discharge: HOME | End: 2022-02-07
Payer: MEDICAID

## 2022-02-07 ENCOUNTER — APPOINTMENT (OUTPATIENT)
Dept: GASTROENTEROLOGY | Facility: HOSPITAL | Age: 72
End: 2022-02-07
Payer: MEDICAID

## 2022-02-07 VITALS
TEMPERATURE: 99 F | DIASTOLIC BLOOD PRESSURE: 84 MMHG | HEIGHT: 65 IN | WEIGHT: 169.09 LBS | RESPIRATION RATE: 19 BRPM | OXYGEN SATURATION: 95 % | HEART RATE: 95 BPM | SYSTOLIC BLOOD PRESSURE: 163 MMHG

## 2022-02-07 DIAGNOSIS — H25.9 UNSPECIFIED AGE-RELATED CATARACT: Chronic | ICD-10-CM

## 2022-02-07 DIAGNOSIS — K92.1 MELENA: ICD-10-CM

## 2022-02-07 DIAGNOSIS — Z98.890 OTHER SPECIFIED POSTPROCEDURAL STATES: Chronic | ICD-10-CM

## 2022-02-07 DIAGNOSIS — Z98.49 CATARACT EXTRACTION STATUS, UNSPECIFIED EYE: Chronic | ICD-10-CM

## 2022-02-07 PROCEDURE — 45378 DIAGNOSTIC COLONOSCOPY: CPT

## 2022-02-07 PROCEDURE — 88305 TISSUE EXAM BY PATHOLOGIST: CPT | Mod: 26

## 2022-02-07 PROCEDURE — 43239 EGD BIOPSY SINGLE/MULTIPLE: CPT | Mod: 59

## 2022-02-07 PROCEDURE — 88305 TISSUE EXAM BY PATHOLOGIST: CPT

## 2022-02-07 PROCEDURE — 88313 SPECIAL STAINS GROUP 2: CPT

## 2022-02-07 PROCEDURE — 88313 SPECIAL STAINS GROUP 2: CPT | Mod: 26

## 2022-02-07 RX ORDER — METOPROLOL TARTRATE 50 MG
1 TABLET ORAL
Qty: 0 | Refills: 0 | DISCHARGE

## 2022-02-07 RX ORDER — LEFLUNOMIDE 10 MG/1
1 TABLET ORAL
Qty: 0 | Refills: 0 | DISCHARGE

## 2022-02-07 RX ORDER — FOLIC ACID 0.8 MG
1 TABLET ORAL
Qty: 0 | Refills: 0 | DISCHARGE

## 2022-02-07 RX ORDER — LISINOPRIL 2.5 MG/1
1 TABLET ORAL
Qty: 0 | Refills: 0 | DISCHARGE

## 2022-02-07 RX ORDER — METHOTREXATE 2.5 MG/1
12.5 TABLET ORAL
Qty: 0 | Refills: 0 | DISCHARGE

## 2022-02-07 RX ORDER — PREGABALIN 225 MG/1
1 CAPSULE ORAL
Qty: 0 | Refills: 0 | DISCHARGE

## 2022-02-07 NOTE — ASU PATIENT PROFILE, ADULT - NSICDXPASTMEDICALHX_GEN_ALL_CORE_FT
PAST MEDICAL HISTORY:  2019 novel coronavirus disease (COVID-19) NOVEMBER 2021    Anemia     Hypertension     Peptic ulcer     Rheumatoid arthritis

## 2022-02-07 NOTE — ASU PREOP CHECKLIST - VIA
How Severe Are Your Spot(S)?: mild Have Your Spot(S) Been Treated In The Past?: has not been treated Hpi Title: Evaluation of Skin Lesions Hpi Title: Evaluation of a Skin Lesion stretcher

## 2022-02-07 NOTE — ASU PATIENT PROFILE, ADULT - NSICDXPASTSURGICALHX_GEN_ALL_CORE_FT
PAST SURGICAL HISTORY:  Age related cataract OU    H/O knee surgery Bilateral    History of cataract surgery

## 2022-02-07 NOTE — ASU PATIENT PROFILE, ADULT - FALL HARM RISK - UNIVERSAL INTERVENTIONS
Bed in lowest position, wheels locked, appropriate side rails in place/Call bell, personal items and telephone in reach/Instruct patient to call for assistance before getting out of bed or chair/Non-slip footwear when patient is out of bed/[place]ha to call system/Physically safe environment - no spills, clutter or unnecessary equipment/Purposeful Proactive Rounding/Room/bathroom lighting operational, light cord in reach

## 2022-02-08 LAB — SURGICAL PATHOLOGY STUDY: SIGNIFICANT CHANGE UP

## 2022-02-09 DIAGNOSIS — K64.8 OTHER HEMORRHOIDS: ICD-10-CM

## 2022-02-09 DIAGNOSIS — I10 ESSENTIAL (PRIMARY) HYPERTENSION: ICD-10-CM

## 2022-02-09 DIAGNOSIS — Z88.3 ALLERGY STATUS TO OTHER ANTI-INFECTIVE AGENTS: ICD-10-CM

## 2022-02-09 DIAGNOSIS — Z12.11 ENCOUNTER FOR SCREENING FOR MALIGNANT NEOPLASM OF COLON: ICD-10-CM

## 2022-02-09 DIAGNOSIS — K44.9 DIAPHRAGMATIC HERNIA WITHOUT OBSTRUCTION OR GANGRENE: ICD-10-CM

## 2022-02-09 DIAGNOSIS — K21.9 GASTRO-ESOPHAGEAL REFLUX DISEASE WITHOUT ESOPHAGITIS: ICD-10-CM

## 2022-02-09 DIAGNOSIS — D64.9 ANEMIA, UNSPECIFIED: ICD-10-CM

## 2022-02-09 DIAGNOSIS — E78.5 HYPERLIPIDEMIA, UNSPECIFIED: ICD-10-CM

## 2022-02-09 DIAGNOSIS — Z79.52 LONG TERM (CURRENT) USE OF SYSTEMIC STEROIDS: ICD-10-CM

## 2022-02-09 DIAGNOSIS — K21.00 GASTRO-ESOPHAGEAL REFLUX DISEASE WITH ESOPHAGITIS, WITHOUT BLEEDING: ICD-10-CM

## 2022-02-09 DIAGNOSIS — M06.9 RHEUMATOID ARTHRITIS, UNSPECIFIED: ICD-10-CM

## 2022-02-09 DIAGNOSIS — Z82.49 FAMILY HISTORY OF ISCHEMIC HEART DISEASE AND OTHER DISEASES OF THE CIRCULATORY SYSTEM: ICD-10-CM

## 2022-02-10 ENCOUNTER — NON-APPOINTMENT (OUTPATIENT)
Age: 72
End: 2022-02-10

## 2022-02-21 ENCOUNTER — NON-APPOINTMENT (OUTPATIENT)
Age: 72
End: 2022-02-21

## 2022-02-21 ENCOUNTER — APPOINTMENT (OUTPATIENT)
Dept: CARDIOLOGY | Facility: CLINIC | Age: 72
End: 2022-02-21
Payer: MEDICAID

## 2022-02-21 VITALS — WEIGHT: 171 LBS | OXYGEN SATURATION: 100 % | HEART RATE: 77 BPM | BODY MASS INDEX: 28.49 KG/M2 | HEIGHT: 65 IN

## 2022-02-21 VITALS — DIASTOLIC BLOOD PRESSURE: 76 MMHG | SYSTOLIC BLOOD PRESSURE: 150 MMHG

## 2022-02-21 DIAGNOSIS — I34.0 NONRHEUMATIC MITRAL (VALVE) INSUFFICIENCY: ICD-10-CM

## 2022-02-21 PROCEDURE — 99214 OFFICE O/P EST MOD 30 MIN: CPT

## 2022-02-21 PROCEDURE — 93000 ELECTROCARDIOGRAM COMPLETE: CPT

## 2022-02-21 RX ORDER — METOPROLOL SUCCINATE 50 MG/1
50 TABLET, EXTENDED RELEASE ORAL
Qty: 90 | Refills: 1 | Status: ACTIVE | COMMUNITY

## 2022-02-21 RX ORDER — ATORVASTATIN CALCIUM 20 MG/1
20 TABLET, FILM COATED ORAL
Qty: 1 | Refills: 1 | Status: DISCONTINUED | COMMUNITY
End: 2022-02-21

## 2022-02-21 NOTE — CARDIOLOGY SUMMARY
[de-identified] : 2/21/22  sinus rhythm low QRS voltage  [de-identified] : 7/9/20 Mild to moderate MR , moderate TR RVSP 46 mm hg ( no significant change from )\par \par 11/24/2021  normal EF mild MR TR

## 2022-02-21 NOTE — PHYSICAL EXAM
[Well Developed] : well developed [Well Nourished] : well nourished [Normal Conjunctiva] : normal conjunctiva [No Acute Distress] : no acute distress [Normal Venous Pressure] : normal venous pressure [No Carotid Bruit] : no carotid bruit [Normal Rate] : normal [Normal S1] : normal S1 [Normal S2] : normal S2 [No Murmur] : no murmurs heard [No Pitting Edema] : no pitting edema present [2+] : left 2+ [No Abnormalities] : the abdominal aorta was not enlarged and no bruit was heard [Clear Lung Fields] : clear lung fields [Good Air Entry] : good air entry [No Respiratory Distress] : no respiratory distress  [Soft] : abdomen soft [Normal Bowel Sounds] : normal bowel sounds [Non Tender] : non-tender [Normal Gait] : normal gait [No Edema] : no edema [No Cyanosis] : no cyanosis [No Clubbing] : no clubbing [No Varicosities] : no varicosities [No Skin Lesions] : no skin lesions [No Rash] : no rash [Moves all extremities] : moves all extremities [No Focal Deficits] : no focal deficits [Normal Speech] : normal speech [Alert and Oriented] : alert and oriented [Normal memory] : normal memory [S3] : no S3 [S4] : no S4 [Right Carotid Bruit] : no bruit heard over the right carotid [Left Carotid Bruit] : no bruit heard over the left carotid [Right Femoral Bruit] : no bruit heard over the right femoral artery [Left Femoral Bruit] : no bruit heard over the left femoral artery

## 2022-02-21 NOTE — HISTORY OF PRESENT ILLNESS
[FreeTextEntry1] : Patient  with hx of rheumatoid arthritis , hypertension, HLD  came for follow up says she is doing ok , she had COVID twice , most recent 29 th december 2021 , recovered well , \par \par patient denies any chest pain  or palpitation or dizziness \par \par patients blood pressure mild elevated  as she was not compliant to low salt diet ,  \par \par Patient does have occasional reflux symptoms on eating certain type of food controlled with omeprazole\par \par patient blood work showed normal lipid profile   \par \par patient is under care of rheumatologist \par \par \par

## 2022-02-21 NOTE — ASSESSMENT
[FreeTextEntry1] :  Patient with above hx , who is having fatigue , unclear etiology may be decondition which is improved  , no evidence of orthostasis ,  , no evidence of ischemia ,  normal ventricular function    ( she can not walk due to knees arthritis  )   \par \par un controlled hypertension without immediate orthostasis .patient  was advised to follow low salt diet , advised to continue lisinopril  20 mg po daily if her home BP continue to be higher than 130 SBP , will double the dose \par \par occasional skipped heart beat ? PAC\par \par Mild pure hypercholesteremia with high HDL , now controlled  on atorvastatin 20 mg po daily , follow up of blood work \par \par chronic mild anemia ,  patient is on methotrexate , continue monitoring \par \par Mild pulmonary hypertension  stable , \par \par Mild to moderate MR , moderate TR  continue to monitor severity with periodic echo \par \par follow up after 4 months \par

## 2022-02-22 PROBLEM — U07.1 COVID-19: Chronic | Status: ACTIVE | Noted: 2022-02-07

## 2022-03-14 ENCOUNTER — APPOINTMENT (OUTPATIENT)
Dept: PULMONOLOGY | Facility: CLINIC | Age: 72
End: 2022-03-14
Payer: MEDICAID

## 2022-03-14 VITALS
RESPIRATION RATE: 16 BRPM | BODY MASS INDEX: 27.79 KG/M2 | WEIGHT: 167 LBS | SYSTOLIC BLOOD PRESSURE: 128 MMHG | DIASTOLIC BLOOD PRESSURE: 80 MMHG | OXYGEN SATURATION: 98 % | HEART RATE: 78 BPM

## 2022-03-14 DIAGNOSIS — E78.00 PURE HYPERCHOLESTEROLEMIA, UNSPECIFIED: ICD-10-CM

## 2022-03-14 DIAGNOSIS — Z87.19 PERSONAL HISTORY OF OTHER DISEASES OF THE DIGESTIVE SYSTEM: ICD-10-CM

## 2022-03-14 DIAGNOSIS — D64.9 ANEMIA, UNSPECIFIED: ICD-10-CM

## 2022-03-14 DIAGNOSIS — M06.9 RHEUMATOID ARTHRITIS, UNSPECIFIED: ICD-10-CM

## 2022-03-14 DIAGNOSIS — Z87.891 PERSONAL HISTORY OF NICOTINE DEPENDENCE: ICD-10-CM

## 2022-03-14 DIAGNOSIS — D63.8 ANEMIA IN OTHER CHRONIC DISEASES CLASSIFIED ELSEWHERE: ICD-10-CM

## 2022-03-14 DIAGNOSIS — Z86.16 PERSONAL HISTORY OF COVID-19: ICD-10-CM

## 2022-03-14 DIAGNOSIS — K21.9 GASTRO-ESOPHAGEAL REFLUX DISEASE W/OUT ESOPHAGITIS: ICD-10-CM

## 2022-03-14 DIAGNOSIS — R53.83 OTHER FATIGUE: ICD-10-CM

## 2022-03-14 DIAGNOSIS — I10 ESSENTIAL (PRIMARY) HYPERTENSION: ICD-10-CM

## 2022-03-14 PROCEDURE — 99204 OFFICE O/P NEW MOD 45 MIN: CPT

## 2022-03-14 RX ORDER — AZITHROMYCIN 250 MG/1
250 TABLET, FILM COATED ORAL
Qty: 6 | Refills: 0 | Status: DISCONTINUED | COMMUNITY
Start: 2022-01-05 | End: 2022-03-14

## 2022-03-14 RX ORDER — PROMETHAZINE HYDROCHLORIDE AND DEXTROMETHORPHAN HYDROBROMIDE ORAL SOLUTION 15; 6.25 MG/5ML; MG/5ML
6.25-15 SOLUTION ORAL
Qty: 240 | Refills: 0 | Status: DISCONTINUED | COMMUNITY
Start: 2022-01-05 | End: 2022-03-14

## 2022-03-14 RX ORDER — CYANOCOBALAMIN (VITAMIN B-12) 1000 MCG
1000 TABLET, EXTENDED RELEASE ORAL
Qty: 30 | Refills: 0 | Status: DISCONTINUED | COMMUNITY
Start: 2022-02-14 | End: 2022-03-14

## 2022-03-14 RX ORDER — GUAIFENESIN 100 MG/5ML
100 LIQUID ORAL
Qty: 280 | Refills: 0 | Status: DISCONTINUED | COMMUNITY
Start: 2021-11-24 | End: 2022-03-14

## 2022-03-14 RX ORDER — CHLORHEXIDINE GLUCONATE 4 %
325 (65 FE) LIQUID (ML) TOPICAL
Qty: 60 | Refills: 0 | Status: ACTIVE | COMMUNITY
Start: 2021-12-06

## 2022-03-14 RX ORDER — CIPROFLOXACIN HYDROCHLORIDE 500 MG/1
500 TABLET, FILM COATED ORAL
Qty: 14 | Refills: 0 | Status: DISCONTINUED | COMMUNITY
Start: 2021-11-17 | End: 2022-03-14

## 2022-03-14 RX ORDER — BENZONATATE 100 MG/1
100 CAPSULE ORAL
Qty: 21 | Refills: 0 | Status: DISCONTINUED | COMMUNITY
Start: 2021-11-24 | End: 2022-03-14

## 2022-03-14 RX ORDER — METHOCARBAMOL 500 MG/1
500 TABLET, FILM COATED ORAL
Qty: 30 | Refills: 0 | Status: ACTIVE | COMMUNITY
Start: 2022-02-02

## 2022-03-14 RX ORDER — CEFUROXIME AXETIL 500 MG/1
500 TABLET ORAL
Qty: 14 | Refills: 0 | Status: DISCONTINUED | COMMUNITY
Start: 2021-11-13 | End: 2022-03-14

## 2022-03-14 RX ORDER — PROCHLORPERAZINE MALEATE 5 MG/1
5 TABLET ORAL
Qty: 30 | Refills: 0 | Status: DISCONTINUED | COMMUNITY
Start: 2021-11-17 | End: 2022-03-14

## 2022-03-14 RX ORDER — PREDNISONE 10 MG/1
10 TABLET ORAL
Qty: 15 | Refills: 0 | Status: DISCONTINUED | COMMUNITY
Start: 2022-02-02 | End: 2022-03-14

## 2022-04-12 ENCOUNTER — APPOINTMENT (OUTPATIENT)
Dept: PULMONOLOGY | Facility: CLINIC | Age: 72
End: 2022-04-12
Payer: MEDICAID

## 2022-04-12 VITALS — HEIGHT: 64 IN | WEIGHT: 166 LBS | BODY MASS INDEX: 28.34 KG/M2

## 2022-04-12 PROCEDURE — 85018 HEMOGLOBIN: CPT | Mod: QW

## 2022-04-12 PROCEDURE — 94727 GAS DIL/WSHOT DETER LNG VOL: CPT

## 2022-04-12 PROCEDURE — 94729 DIFFUSING CAPACITY: CPT

## 2022-04-12 PROCEDURE — 94010 BREATHING CAPACITY TEST: CPT

## 2022-04-26 ENCOUNTER — APPOINTMENT (OUTPATIENT)
Dept: CT IMAGING | Facility: CLINIC | Age: 72
End: 2022-04-26
Payer: MEDICAID

## 2022-04-26 ENCOUNTER — OUTPATIENT (OUTPATIENT)
Dept: OUTPATIENT SERVICES | Facility: HOSPITAL | Age: 72
LOS: 1 days | End: 2022-04-26
Payer: MEDICAID

## 2022-04-26 DIAGNOSIS — H25.9 UNSPECIFIED AGE-RELATED CATARACT: Chronic | ICD-10-CM

## 2022-04-26 DIAGNOSIS — Z98.49 CATARACT EXTRACTION STATUS, UNSPECIFIED EYE: Chronic | ICD-10-CM

## 2022-04-26 DIAGNOSIS — Z98.890 OTHER SPECIFIED POSTPROCEDURAL STATES: Chronic | ICD-10-CM

## 2022-04-26 DIAGNOSIS — R91.8 OTHER NONSPECIFIC ABNORMAL FINDING OF LUNG FIELD: ICD-10-CM

## 2022-04-26 PROCEDURE — 71250 CT THORAX DX C-: CPT

## 2022-04-26 PROCEDURE — 71250 CT THORAX DX C-: CPT | Mod: 26

## 2022-05-02 ENCOUNTER — OUTPATIENT (OUTPATIENT)
Dept: OUTPATIENT SERVICES | Facility: HOSPITAL | Age: 72
LOS: 1 days | End: 2022-05-02
Payer: MEDICAID

## 2022-05-02 ENCOUNTER — APPOINTMENT (OUTPATIENT)
Age: 72
End: 2022-05-02
Payer: MEDICAID

## 2022-05-02 DIAGNOSIS — G47.33 OBSTRUCTIVE SLEEP APNEA (ADULT) (PEDIATRIC): ICD-10-CM

## 2022-05-02 DIAGNOSIS — Z98.49 CATARACT EXTRACTION STATUS, UNSPECIFIED EYE: Chronic | ICD-10-CM

## 2022-05-02 DIAGNOSIS — H25.9 UNSPECIFIED AGE-RELATED CATARACT: Chronic | ICD-10-CM

## 2022-05-02 DIAGNOSIS — Z98.890 OTHER SPECIFIED POSTPROCEDURAL STATES: Chronic | ICD-10-CM

## 2022-05-02 PROCEDURE — 95806 SLEEP STUDY UNATT&RESP EFFT: CPT | Mod: 26

## 2022-05-02 PROCEDURE — 95806 SLEEP STUDY UNATT&RESP EFFT: CPT

## 2022-06-06 ENCOUNTER — APPOINTMENT (OUTPATIENT)
Dept: PULMONOLOGY | Facility: CLINIC | Age: 72
End: 2022-06-06
Payer: MEDICAID

## 2022-06-06 VITALS
OXYGEN SATURATION: 96 % | BODY MASS INDEX: 28.15 KG/M2 | SYSTOLIC BLOOD PRESSURE: 140 MMHG | WEIGHT: 164 LBS | HEART RATE: 65 BPM | DIASTOLIC BLOOD PRESSURE: 80 MMHG | RESPIRATION RATE: 16 BRPM

## 2022-06-06 DIAGNOSIS — R06.00 DYSPNEA, UNSPECIFIED: ICD-10-CM

## 2022-06-06 DIAGNOSIS — I27.20 PULMONARY HYPERTENSION, UNSPECIFIED: ICD-10-CM

## 2022-06-06 DIAGNOSIS — G47.33 OBSTRUCTIVE SLEEP APNEA (ADULT) (PEDIATRIC): ICD-10-CM

## 2022-06-06 DIAGNOSIS — R91.8 OTHER NONSPECIFIC ABNORMAL FINDING OF LUNG FIELD: ICD-10-CM

## 2022-06-06 PROCEDURE — 99214 OFFICE O/P EST MOD 30 MIN: CPT

## 2022-06-16 ENCOUNTER — APPOINTMENT (OUTPATIENT)
Dept: PULMONOLOGY | Facility: CLINIC | Age: 72
End: 2022-06-16

## 2022-09-19 ENCOUNTER — APPOINTMENT (OUTPATIENT)
Dept: CARDIOLOGY | Facility: CLINIC | Age: 72
End: 2022-09-19

## 2022-12-12 ENCOUNTER — RX RENEWAL (OUTPATIENT)
Age: 72
End: 2022-12-12

## 2022-12-12 RX ORDER — ATORVASTATIN CALCIUM 20 MG/1
20 TABLET, FILM COATED ORAL
Qty: 90 | Refills: 0 | Status: ACTIVE | COMMUNITY
Start: 2022-02-21 | End: 1900-01-01

## 2023-03-16 NOTE — ASSESSMENT
[FreeTextEntry1] : #1 dizziness. Patient was instructed to ensure she drinks more fluids. She will go from a lying to standing position slowly. Continue to follow with cardiology, to have echocardiogram as well.\par \par #2 anemia. Hemoglobin 10.9. Will repeat CBC today. Patient states that she will follow closely with her PCP, Dr. Villanueva, for further evaluation and treatment.\par \par #3 mild restriction on pulmonary function testing. Patient is not having any respiratory symptoms. May be due to overweight status. She will have PA and lateral chest x-ray. I've asked her to return for a following pulmonary appointment in 3 months. Call or return anytime should there be any increase in symptoms or clinical change.\par \par RV 3 months.\par \par report sent to Dr Magi Arnold and Dr Palla Venugopal. Sarecycline Counseling: Patient advised regarding possible photosensitivity and discoloration of the teeth, skin, lips, tongue and gums.  Patient instructed to avoid sunlight, if possible.  When exposed to sunlight, patients should wear protective clothing, sunglasses, and sunscreen.  The patient was instructed to call the office immediately if the following severe adverse effects occur:  hearing changes, easy bruising/bleeding, severe headache, or vision changes.  The patient verbalized understanding of the proper use and possible adverse effects of sarecycline.  All of the patient's questions and concerns were addressed.

## 2023-09-06 ENCOUNTER — OFFICE VISIT (OUTPATIENT)
Dept: URBAN - METROPOLITAN AREA CLINIC 23 | Facility: CLINIC | Age: 73
End: 2023-09-06
Payer: COMMERCIAL

## 2023-09-06 DIAGNOSIS — M06.9 RHEUMATOID ARTHRITIS: Primary | ICD-10-CM

## 2023-09-06 DIAGNOSIS — Z79.899 OTHER LONG TERM (CURRENT) DRUG THERAPY: ICD-10-CM

## 2023-09-06 PROCEDURE — 92004 COMPRE OPH EXAM NEW PT 1/>: CPT | Performed by: OPTOMETRIST

## 2023-09-06 PROCEDURE — 92134 CPTRZ OPH DX IMG PST SGM RTA: CPT | Performed by: OPTOMETRIST

## 2023-09-06 ASSESSMENT — KERATOMETRY
OS: 43.50
OD: 43.75

## 2023-09-06 ASSESSMENT — INTRAOCULAR PRESSURE
OD: 17
OS: 17

## 2024-09-10 ENCOUNTER — OFFICE VISIT (OUTPATIENT)
Dept: URBAN - METROPOLITAN AREA CLINIC 23 | Facility: CLINIC | Age: 74
End: 2024-09-10
Payer: COMMERCIAL

## 2024-09-10 DIAGNOSIS — H04.123 DRY EYE SYNDROME OF BILATERAL LACRIMAL GLANDS: ICD-10-CM

## 2024-09-10 DIAGNOSIS — M06.9 RHEUMATOID ARTHRITIS: ICD-10-CM

## 2024-09-10 DIAGNOSIS — H43.813 VITREOUS DEGENERATION, BILATERAL: ICD-10-CM

## 2024-09-10 DIAGNOSIS — Z79.899 OTHER LONG TERM (CURRENT) DRUG THERAPY: Primary | ICD-10-CM

## 2024-09-10 PROCEDURE — 99214 OFFICE O/P EST MOD 30 MIN: CPT

## 2024-09-10 PROCEDURE — 92134 CPTRZ OPH DX IMG PST SGM RTA: CPT

## 2024-09-10 ASSESSMENT — INTRAOCULAR PRESSURE
OS: 15
OD: 15

## 2024-09-10 ASSESSMENT — KERATOMETRY
OD: 43.75
OS: 43.38